# Patient Record
Sex: FEMALE | Race: WHITE | NOT HISPANIC OR LATINO | Employment: FULL TIME | URBAN - METROPOLITAN AREA
[De-identification: names, ages, dates, MRNs, and addresses within clinical notes are randomized per-mention and may not be internally consistent; named-entity substitution may affect disease eponyms.]

---

## 2017-08-08 ENCOUNTER — HOSPITAL ENCOUNTER (OUTPATIENT)
Dept: FAMILY MEDICINE CLINIC | Facility: CLINIC | Age: 47
Setting detail: SPECIMEN
Discharge: HOME | End: 2017-08-08
Attending: PREVENTIVE MEDICINE | Admitting: PREVENTIVE MEDICINE

## 2017-08-09 LAB
C TRACH RRNA SPEC QL PROBE: NORMAL
HAV IGM SERPL QL IA: NONREACTIVE
HBV CORE IGM SERPL QL IA: NONREACTIVE
HBV SURFACE AG SERPL QL IA: NONREACTIVE
HCV AB SER DONR QL: NORMAL
HCV AB SER DONR QL: NORMAL
HIV1+2 AB SERPL QL IA: NORMAL
N GONORRHOEA RRNA SPEC QL PROBE: NORMAL
SPECIMEN SOURCE: NORMAL
T PALLIDUM IGG SER QL: NONREACTIVE

## 2017-08-15 LAB — HSV1 IGM TITR SER IF: NEGATIVE {TITER}

## 2018-10-18 ENCOUNTER — HOSPITAL ENCOUNTER (OUTPATIENT)
Dept: FAMILY MEDICINE CLINIC | Facility: CLINIC | Age: 48
Setting detail: SPECIMEN
Discharge: HOME | End: 2018-10-18
Attending: PREVENTIVE MEDICINE | Admitting: PREVENTIVE MEDICINE

## 2018-10-18 LAB
ALBUMIN SERPL-MCNC: 3.5 G/DL (ref 3.5–4.8)
ALBUMIN/GLOB SERPL: 1.2 {RATIO} (ref 1–1.7)
ALP SERPL-CCNC: 103 IU/L (ref 32–91)
ALT SERPL-CCNC: 16 IU/L (ref 14–54)
ANION GAP SERPL CALC-SCNC: 12.4 MMOL/L (ref 10–20)
AST SERPL-CCNC: 18 IU/L (ref 15–41)
BASOPHILS # BLD AUTO: 0 10*3/UL (ref 0–0.2)
BASOPHILS NFR BLD AUTO: 1 % (ref 0–2)
BILIRUB SERPL-MCNC: 0.9 MG/DL (ref 0.3–1.2)
BUN SERPL-MCNC: 9 MG/DL (ref 8–20)
BUN/CREAT SERPL: 11.3 (ref 5.4–26.2)
CALCIUM SERPL-MCNC: 8.8 MG/DL (ref 8.9–10.3)
CHLORIDE SERPL-SCNC: 100 MMOL/L (ref 101–111)
CHOLEST SERPL-MCNC: 217 MG/DL
CHOLEST/HDLC SERPL: 3.7 {RATIO}
CONV CO2: 28 MMOL/L (ref 22–32)
CONV LDL CHOLESTEROL DIRECT: 155 MG/DL (ref 0–100)
CONV TOTAL PROTEIN: 6.4 G/DL (ref 6.1–7.9)
CREAT UR-MCNC: 0.8 MG/DL (ref 0.4–1)
DIFFERENTIAL METHOD BLD: (no result)
EOSINOPHIL # BLD AUTO: 0.3 10*3/UL (ref 0–0.3)
EOSINOPHIL # BLD AUTO: 5 % (ref 0–3)
ERYTHROCYTE [DISTWIDTH] IN BLOOD BY AUTOMATED COUNT: 13.9 % (ref 11.5–14.5)
GLOBULIN UR ELPH-MCNC: 2.9 G/DL (ref 2.5–3.8)
GLUCOSE SERPL-MCNC: 87 MG/DL (ref 65–99)
HCT VFR BLD AUTO: 41.2 % (ref 35–49)
HDLC SERPL-MCNC: 58 MG/DL
HGB BLD-MCNC: 13.8 G/DL (ref 12–15)
LDLC/HDLC SERPL: 2.7 {RATIO}
LIPID INTERPRETATION: ABNORMAL
LYMPHOCYTES # BLD AUTO: 1.8 10*3/UL (ref 0.8–4.8)
LYMPHOCYTES NFR BLD AUTO: 32 % (ref 18–42)
MCH RBC QN AUTO: 28.3 PG (ref 26–32)
MCHC RBC AUTO-ENTMCNC: 33.4 G/DL (ref 32–36)
MCV RBC AUTO: 84.5 FL (ref 80–94)
MONOCYTES # BLD AUTO: 0.5 10*3/UL (ref 0.1–1.3)
MONOCYTES NFR BLD AUTO: 9 % (ref 2–11)
NEUTROPHILS # BLD AUTO: 3 10*3/UL (ref 2.3–8.6)
NEUTROPHILS NFR BLD AUTO: 53 % (ref 50–75)
NRBC BLD AUTO-RTO: 0 /100{WBCS}
NRBC/RBC NFR BLD MANUAL: 0 10*3/UL
PLATELET # BLD AUTO: 272 10*3/UL (ref 150–450)
PMV BLD AUTO: 9.3 FL (ref 7.4–10.4)
POTASSIUM SERPL-SCNC: 4.4 MMOL/L (ref 3.6–5.1)
RBC # BLD AUTO: 4.87 10*6/UL (ref 4–5.4)
SODIUM SERPL-SCNC: 136 MMOL/L (ref 136–144)
TRIGL SERPL-MCNC: 89 MG/DL
VLDLC SERPL CALC-MCNC: 4 MG/DL
WBC # BLD AUTO: 5.6 10*3/UL (ref 4.5–11.5)

## 2018-10-19 LAB — 25(OH)D3 SERPL-MCNC: 25 NG/ML (ref 30–100)

## 2019-01-14 ENCOUNTER — HOSPITAL ENCOUNTER (OUTPATIENT)
Dept: FAMILY MEDICINE CLINIC | Facility: CLINIC | Age: 49
Setting detail: SPECIMEN
Discharge: HOME | End: 2019-01-14
Attending: PREVENTIVE MEDICINE | Admitting: PREVENTIVE MEDICINE

## 2019-01-14 LAB
ALBUMIN SERPL-MCNC: 3.8 G/DL (ref 3.5–4.8)
ALBUMIN/GLOB SERPL: 1.2 {RATIO} (ref 1–1.7)
ALP SERPL-CCNC: 98 IU/L (ref 32–91)
ALT SERPL-CCNC: 16 IU/L (ref 14–54)
ANION GAP SERPL CALC-SCNC: 12.7 MMOL/L (ref 10–20)
AST SERPL-CCNC: 19 IU/L (ref 15–41)
BASOPHILS # BLD AUTO: 0 10*3/UL (ref 0–0.2)
BASOPHILS NFR BLD AUTO: 1 % (ref 0–2)
BILIRUB SERPL-MCNC: 0.5 MG/DL (ref 0.3–1.2)
BUN SERPL-MCNC: 9 MG/DL (ref 8–20)
BUN/CREAT SERPL: 11.3 (ref 5.4–26.2)
CALCIUM SERPL-MCNC: 9.2 MG/DL (ref 8.9–10.3)
CHLORIDE SERPL-SCNC: 105 MMOL/L (ref 101–111)
CHOLEST SERPL-MCNC: 223 MG/DL
CHOLEST/HDLC SERPL: 3.9 {RATIO}
CONV CO2: 26 MMOL/L (ref 22–32)
CONV LDL CHOLESTEROL DIRECT: 160 MG/DL (ref 0–100)
CONV TOTAL PROTEIN: 6.9 G/DL (ref 6.1–7.9)
CREAT UR-MCNC: 0.8 MG/DL (ref 0.4–1)
DIFFERENTIAL METHOD BLD: (no result)
EOSINOPHIL # BLD AUTO: 0.2 10*3/UL (ref 0–0.3)
EOSINOPHIL # BLD AUTO: 4 % (ref 0–3)
ERYTHROCYTE [DISTWIDTH] IN BLOOD BY AUTOMATED COUNT: 14.5 % (ref 11.5–14.5)
GLOBULIN UR ELPH-MCNC: 3.1 G/DL (ref 2.5–3.8)
GLUCOSE SERPL-MCNC: 83 MG/DL (ref 65–99)
HCT VFR BLD AUTO: 44.8 % (ref 35–49)
HDLC SERPL-MCNC: 58 MG/DL
HGB BLD-MCNC: 14.6 G/DL (ref 12–15)
LDLC/HDLC SERPL: 2.8 {RATIO}
LIPID INTERPRETATION: ABNORMAL
LYMPHOCYTES # BLD AUTO: 1.9 10*3/UL (ref 0.8–4.8)
LYMPHOCYTES NFR BLD AUTO: 33 % (ref 18–42)
MCH RBC QN AUTO: 28.2 PG (ref 26–32)
MCHC RBC AUTO-ENTMCNC: 32.7 G/DL (ref 32–36)
MCV RBC AUTO: 86.1 FL (ref 80–94)
MONOCYTES # BLD AUTO: 0.3 10*3/UL (ref 0.1–1.3)
MONOCYTES NFR BLD AUTO: 6 % (ref 2–11)
NEUTROPHILS # BLD AUTO: 3.1 10*3/UL (ref 2.3–8.6)
NEUTROPHILS NFR BLD AUTO: 56 % (ref 50–75)
NRBC BLD AUTO-RTO: 0 /100{WBCS}
NRBC/RBC NFR BLD MANUAL: 0 10*3/UL
PLATELET # BLD AUTO: 282 10*3/UL (ref 150–450)
PMV BLD AUTO: 8.9 FL (ref 7.4–10.4)
POTASSIUM SERPL-SCNC: 4.7 MMOL/L (ref 3.6–5.1)
RBC # BLD AUTO: 5.2 10*6/UL (ref 4–5.4)
SODIUM SERPL-SCNC: 139 MMOL/L (ref 136–144)
TRIGL SERPL-MCNC: 108 MG/DL
VLDLC SERPL CALC-MCNC: 5.8 MG/DL
WBC # BLD AUTO: 5.6 10*3/UL (ref 4.5–11.5)

## 2019-01-28 ENCOUNTER — HOSPITAL ENCOUNTER (OUTPATIENT)
Dept: ONCOLOGY | Facility: CLINIC | Age: 49
Setting detail: INFUSION SERIES
Discharge: HOME | End: 2019-01-28
Attending: INTERNAL MEDICINE | Admitting: INTERNAL MEDICINE

## 2019-01-28 ENCOUNTER — HOSPITAL ENCOUNTER (OUTPATIENT)
Dept: ONCOLOGY | Facility: HOSPITAL | Age: 49
Discharge: HOME | End: 2019-01-28
Attending: INTERNAL MEDICINE | Admitting: INTERNAL MEDICINE

## 2019-01-28 ENCOUNTER — CLINICAL SUPPORT (OUTPATIENT)
Dept: ONCOLOGY | Facility: HOSPITAL | Age: 49
End: 2019-01-28

## 2019-01-28 LAB
ALBUMIN SERPL-MCNC: 3.4 G/DL (ref 3.5–4.8)
ALBUMIN/GLOB SERPL: 1.2 {RATIO} (ref 1–1.7)
ALP SERPL-CCNC: 94 IU/L (ref 32–91)
ALT SERPL-CCNC: 15 IU/L (ref 14–54)
ANION GAP SERPL CALC-SCNC: 11.2 MMOL/L (ref 10–20)
AST SERPL-CCNC: 17 IU/L (ref 15–41)
BILIRUB SERPL-MCNC: 0.2 MG/DL (ref 0.3–1.2)
BUN SERPL-MCNC: 4 MG/DL (ref 8–20)
BUN/CREAT SERPL: 5 (ref 5.4–26.2)
CALCIUM SERPL-MCNC: 8.7 MG/DL (ref 8.9–10.3)
CHLORIDE SERPL-SCNC: 102 MMOL/L (ref 101–111)
CONV CO2: 26 MMOL/L (ref 22–32)
CONV TOTAL PROTEIN: 6.3 G/DL (ref 6.1–7.9)
CREAT UR-MCNC: 0.8 MG/DL (ref 0.4–1)
GLOBULIN UR ELPH-MCNC: 2.9 G/DL (ref 2.5–3.8)
GLUCOSE SERPL-MCNC: 80 MG/DL (ref 65–99)
POTASSIUM SERPL-SCNC: 4.2 MMOL/L (ref 3.6–5.1)
SODIUM SERPL-SCNC: 135 MMOL/L (ref 136–144)

## 2019-01-28 NOTE — PROGRESS NOTES
PATIENTS ONCOLOGY RECORD LOCATED IN [facility]      Subjective     Name:  FRANCO MCKEON     Date:  2019  Address:  [facility] [facility] [place] [address]  Home: [unfilled]  :  1970 AGE:  48 y.o.        RECORDS OBTAINED:  Patients Oncology Record is located in [facility]

## 2019-02-11 ENCOUNTER — HOSPITAL ENCOUNTER (OUTPATIENT)
Dept: ONCOLOGY | Facility: HOSPITAL | Age: 49
Discharge: HOME | End: 2019-02-11
Attending: INTERNAL MEDICINE | Admitting: INTERNAL MEDICINE

## 2019-03-04 ENCOUNTER — HOSPITAL ENCOUNTER (OUTPATIENT)
Dept: ONCOLOGY | Facility: HOSPITAL | Age: 49
Discharge: HOME | End: 2019-03-04

## 2019-03-04 ENCOUNTER — HOSPITAL ENCOUNTER (OUTPATIENT)
Dept: ONCOLOGY | Facility: CLINIC | Age: 49
Setting detail: INFUSION SERIES
Discharge: HOME | End: 2019-03-04
Attending: INTERNAL MEDICINE | Admitting: INTERNAL MEDICINE

## 2019-06-03 ENCOUNTER — CONVERSION ENCOUNTER (OUTPATIENT)
Dept: FAMILY MEDICINE CLINIC | Facility: CLINIC | Age: 49
End: 2019-06-03

## 2019-06-03 ENCOUNTER — HOSPITAL ENCOUNTER (OUTPATIENT)
Dept: FAMILY MEDICINE CLINIC | Facility: CLINIC | Age: 49
Setting detail: SPECIMEN
Discharge: HOME | End: 2019-06-03
Attending: PREVENTIVE MEDICINE | Admitting: PREVENTIVE MEDICINE

## 2019-06-03 LAB
ALBUMIN SERPL-MCNC: 3.9 G/DL (ref 3.5–4.8)
ALBUMIN/GLOB SERPL: 1.1 {RATIO} (ref 1–1.7)
ALP SERPL-CCNC: 105 IU/L (ref 32–91)
ALT SERPL-CCNC: 15 IU/L (ref 14–54)
ANION GAP SERPL CALC-SCNC: 15.1 MMOL/L (ref 10–20)
AST SERPL-CCNC: 19 IU/L (ref 15–41)
BASOPHILS # BLD AUTO: 0.1 10*3/UL (ref 0–0.2)
BASOPHILS NFR BLD AUTO: 1 % (ref 0–2)
BILIRUB SERPL-MCNC: 0.6 MG/DL (ref 0.3–1.2)
BUN SERPL-MCNC: 11 MG/DL (ref 8–20)
BUN/CREAT SERPL: 12.2 (ref 5.4–26.2)
CALCIUM SERPL-MCNC: 9.1 MG/DL (ref 8.9–10.3)
CHLORIDE SERPL-SCNC: 103 MMOL/L (ref 101–111)
CONV CO2: 24 MMOL/L (ref 22–32)
CONV TOTAL PROTEIN: 7.6 G/DL (ref 6.1–7.9)
CREAT UR-MCNC: 0.9 MG/DL (ref 0.4–1)
DIFFERENTIAL METHOD BLD: (no result)
EOSINOPHIL # BLD AUTO: 0.3 10*3/UL (ref 0–0.3)
EOSINOPHIL # BLD AUTO: 5 % (ref 0–3)
ERYTHROCYTE [DISTWIDTH] IN BLOOD BY AUTOMATED COUNT: 14.2 % (ref 11.5–14.5)
ERYTHROCYTE [SEDIMENTATION RATE] IN BLOOD BY WESTERGREN METHOD: 33 MM/HR (ref 0–20)
GLOBULIN UR ELPH-MCNC: 3.7 G/DL (ref 2.5–3.8)
GLUCOSE SERPL-MCNC: 84 MG/DL (ref 65–99)
HCT VFR BLD AUTO: 45.8 % (ref 35–49)
HGB BLD-MCNC: 15.1 G/DL (ref 12–15)
LYMPHOCYTES # BLD AUTO: 2 10*3/UL (ref 0.8–4.8)
LYMPHOCYTES NFR BLD AUTO: 33 % (ref 18–42)
MCH RBC QN AUTO: 28.3 PG (ref 26–32)
MCHC RBC AUTO-ENTMCNC: 32.9 G/DL (ref 32–36)
MCV RBC AUTO: 86 FL (ref 80–94)
MONOCYTES # BLD AUTO: 0.4 10*3/UL (ref 0.1–1.3)
MONOCYTES NFR BLD AUTO: 7 % (ref 2–11)
NEUTROPHILS # BLD AUTO: 3.2 10*3/UL (ref 2.3–8.6)
NEUTROPHILS NFR BLD AUTO: 54 % (ref 50–75)
NRBC BLD AUTO-RTO: 0 /100{WBCS}
NRBC/RBC NFR BLD MANUAL: 0 10*3/UL
PLATELET # BLD AUTO: 311 10*3/UL (ref 150–450)
PMV BLD AUTO: 9.2 FL (ref 7.4–10.4)
POTASSIUM SERPL-SCNC: 4.1 MMOL/L (ref 3.6–5.1)
RBC # BLD AUTO: 5.33 10*6/UL (ref 4–5.4)
SODIUM SERPL-SCNC: 138 MMOL/L (ref 136–144)
TSH SERPL-ACNC: 1.01 UIU/ML (ref 0.34–5.6)
WBC # BLD AUTO: 6.1 10*3/UL (ref 4.5–11.5)

## 2019-06-04 VITALS
DIASTOLIC BLOOD PRESSURE: 75 MMHG | BODY MASS INDEX: 33.27 KG/M2 | OXYGEN SATURATION: 96 % | HEART RATE: 51 BPM | RESPIRATION RATE: 16 BRPM | HEIGHT: 66 IN | SYSTOLIC BLOOD PRESSURE: 123 MMHG | WEIGHT: 207 LBS

## 2019-06-05 ENCOUNTER — HOSPITAL ENCOUNTER (OUTPATIENT)
Dept: CARDIOLOGY | Facility: HOSPITAL | Age: 49
Discharge: HOME | End: 2019-06-05
Attending: PREVENTIVE MEDICINE | Admitting: PREVENTIVE MEDICINE

## 2019-06-06 NOTE — PROGRESS NOTES
Visit Type:  Follow-up Visit  Referring Provider:  Silvia Henderson MD  Primary Provider:  Beatriz MUSA MD MPH    CC:  3 week fu on ear .    History of Present Illness:  Dizziness unimproved and ears still seem congested.  had some vaginal itching while on Amoxicillin and burning with urination but ok now.  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  [place]'s evaluated by Dr. Brooks nad biopsy scheduled 6/10/ Dr. lugo.    Dizziness seems to be when tips head to R and gets up and down. No chest pain or shortness of breath.  Stopped [place] before started Amoxicilloin.  No alcohol butr does smoke Pot twice or so//week and seems strong before dizziness started.      Past Medical History:     Reviewed history from 05/13/2019 and no changes required:                Vision Flashes        Abnormal Pap         No Drug Allergies ?    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        Vaginal wart removed        Tubes in ears         Tonsillectomy        Bilateral Tubal Ligation        Dental removal     Family History Summary:      Reviewed history Last on 10/11/2018 and no changes required:06/03/2019  Nephew - Has Family History of Diabetes - Entered On: 5/24/2016  Mother - Has Family History of Hypertension - Entered On: 5/24/2016  Mother - Has Family History of Heart Disease - Entered On: 5/24/2016  Niece - Has Family History of Diabetes - Entered On: 5/24/2016      Social History:     Reviewed history from 05/13/2019 and no changes required:        Patient currently smokes every day.        Passive Smoke: Y        Alcohol Use: Y        Drug Use: Y        HIV/High Risk: N        Regular Exercise: Y        Hx Domestic Abuse: N        Evangelical Affecting Care: N              Active Medications (reviewed today):  ECHINACEA 500 MG ORAL CAPSULE (ECHINACEA) 1 twice daily  EPIPEN 2-RAAD 0.3 MG/0.3ML  INJECTION SOLUTION AUTO-INJECTOR (EPINEPHRINE) use as directed    Current Allergies (reviewed today):  No known allergies    Current Medications (including medications started today):   ECHINACEA 500 MG ORAL CAPSULE (ECHINACEA) 1 twice daily  EPIPEN 2-RAAD 0.3 MG/0.3ML INJECTION SOLUTION AUTO-INJECTOR (EPINEPHRINE) use as directed      Risk Factors:     Smoked Tobacco Use:  Current every day smoker     Cigarettes:  Yes -- 1 pack(s) per day,Smokeless Tobacco Use:  Never     Counseled to quit/cut down:  yes  Passive smoke exposure:  yes  Drug use:  yes     Substance:  marijuana  HIV high-risk behavior:  no  Caffeine use:  4+ drinks per day  Alcohol use:  yes     Type:  rum      Drinks per day:  social     Has patient --        Felt need to cut down:  no        Been annoyed by complaints:  no        Felt guilty about drinking:  no        Needed eye opener in the morning:  no     Counseled to quit/cut down alcohol use:  yes  Exercise:  yes     Times per week:  5     Type of Exercise:  walking   Seatbelt use:  100 %  Sun Exposure:  remote    Dietary Counseling: yes    Previous Tobacco Use: Signed On - 05/13/2019  Smoked Tobacco Use:  Current every day smoker     Cigarettes:  Yes -- 1 pack(s) per day,Smokeless Tobacco Use:  Never     Counseled to quit/cut down:  yes  Passive smoke exposure:  yes  Drug use:  yes     Substance:  marijuana  HIV high-risk behavior:  no  Caffeine use:  4+ drinks per day    Previous Alcohol Use: Signed On - 05/13/2019  Alcohol use:  yes     Type:  rum      Drinks per day:  social     Has patient --        Felt need to cut down:  no        Been annoyed by complaints:  no        Felt guilty about drinking:  no        Needed eye opener in the morning:  no     Counseled to quit/cut down alcohol use:  yes  Exercise:  yes     Times per week:  5     Type of Exercise:  walking   Seatbelt use:  100 %  Sun Exposure:  remote    Mammogram History:     Date of Last Mammogram:  06/23/2016      Review of  Systems     General       Denies fever, chills, sweats, anorexia, fatigue, weakness, malaise, weight loss, weight gain and sleep disorder.    Eyes       Denies vision loss - 1 eye, double vision, eye irritation, vision loss - both eyes, blurring, eye pain, halos, discharge, light sensitivity, Color Blindness, Decreased night vision, excessive tearing, eye redness, periorbital puffiness and wears   glass/contacts.    ENT       Complains of nasal congestion.       ear fullness    CV       Complains of lightheadedness.    Resp       Denies sleep disturbances due to breathing, cough, shortness of breath, coughing up blood, chest discomfort, wheezing, excessive sputum, excessive snoring and TB exposure risk.           Complains of painful urination.    Derm       Denies excessive perspiration, night sweats, suspicious lesions, changes in nail beds, dryness, poor wound healing, unusual hair distribution, skin cancer, itching, changes in color of skin, flushing, rash, change in lesion, hair loss, change in hair   texture, lesion with inflammation, lesion with increase in size and lesion with change in color.    Neuro       Complains of sensation of room spinning and dizziness.    Allergy       Complains of seasonal allergies.      Vital Signs:    Patient Profile:    48 Years Old Female  Height:     66 inches (167.64 cm)  Weight:     207 pounds  BMI:        33.41     BSA:        2.03  O2 Sat:     96 %  Temp:       98.1 degrees F oral  Pulse rate: 51 / minute  Resp:       16 per minute  BP Sittin / 75  (right arm)  BP Standin / 85  (right arm)  Cuff size:  large      Problems: Active problems were reviewed with the patient during this visit.  Medications: Medications were reviewed with the patient during this visit.  Allergies: Allergies were reviewed with the patient during this visit.  No Known Allergy.  No Known Drug Allergy.        Vitals Entered By: Silvia Henderson MD (Sandy  3, 2019 10:12  AM)      Serial Vital Signs/Assessments:    Comments:  10:17 AM  sitting   By: Silvia Henderson MD        Physical Exam    General:      obese.    Head:      normocephalic and atraumatic.    Eyes:      PERRL/EOM intact, conjunctiva and sclera clear with out nystagmus.  No nystagmus up or down  Ears:      scars but no more redness or dullness..  Well healed R neck scarring  Nose:      no deformity, discharge, inflammation, or lesions.    Mouth:      no deformity or lesions with good dentition.    Neck:      no masses, thyromegaly, or abnormal cervical nodes.    Lungs:      clear bilaterally to auscultation.    Heart:      bradycardia  Abdomen:       normal bowel sounds; no hepatosplenomegaly no ventral,umbilical hernias or masses noted.    Msk:      no deformity or scoliosis noted of thoracic or lumbar spine.    Pulses:      pulses normal in all 4 extremities.    Extremities:      no clubbing, cyanosis, edema, or deformity noted with normal full range of motion of joints of all four extremities   Neurologic:      no focal deficits, cranial nerves II-XII grossly intact with normal sensation, reflexes, coordination, muscle strength and tone.    Skin:      intact without lesions or rashes.    Cervical Nodes:      no significant adenopathy.    Psych:      alert and cooperative; normal mood and affect; normal attention span and concentration.      Diabetes Management Exam:      Foot Exam (with socks and/or shoes not present):        Pulses:           pulses normal in all 4 extremities.        Blood Pressure:  Today's BP: 123/75 mm Hg    Labwork:   Most Recent Lab Results:   LDL: 160 mg/dL 01/14/2019        Impression & Recommendations:    Problem # 1:  DIZZINESS AND GIDDINESS (ICD-780.4) (DHM75-D96)    Orders:  EKG w Interpretation and Report (81589)  24 Hour Holter Monitor (24 Hr HM)  Venipuncture (22934)  [facility] CBC W/DIFF; PATH REVIEW IF INDICATED (CBC)  [facility] COMPREHENSIVE METABOLIC PANEL (CMP) (MPC)  [facility] THYROID  STIMULATING HORMONE (TSH) (TSH)  [facility] SEDIMENTATION RATE (ESR)  Spoke with Dr. Nathan and he'll see this week    Problem # 2:  Bradycardia (ICD-427.89) (HLR93-X35.1)  Eval and treat Cardiology  Holter    Problem # 3:  Acute otitis media, left (ICD-382.9) (CJB19-O00.92)  eval and treat ENT      Patient Instructions:  1)  If dizziness worsens call 911 and go to [place] or local ER.  2)  Staff to see if can get Holter moniter placed today or tomorrow and followup with Dr. Nathan in [place] on Wednesday.  3)  Set up appointment with ENT for sinus congestion.                        Medication Administration    Orders Added:  1)  Ofc Vst, Est Level IV [84146]  2)  EKG w Interpretation and Report [52065]  3)  24 Hour Holter Monitor [24 Hr HM]  4)  Venipuncture [29324]  5)  [facility] CBC W/DIFF; PATH REVIEW IF INDICATED [CBC]  6)  [facility] COMPREHENSIVE METABOLIC PANEL (CMP) [MPC]  7)  [facility] THYROID STIMULATING HORMONE (TSH) [TSH]  8)  [facility] SEDIMENTATION RATE [ESR]  9)  Cardiology Consult [CardOC]  10)  ENT Office Consult [ENTOC]  ]      Electronically signed by Silvia Henderson MD on 06/03/2019 at 11:20 AM  ________________________________________________________________________                   Allergies:   No Known Allergies        Labwork:   Most Recent Lab Results:   LDL: 160 mg/dL 01/14/2019          Impression & Recommendations:    Problem # 4:  TOBACCO USE DISORDER (ICD-305.1) (EBL66-D05.209)  STOP SMOKING      Patient Instructions:  1)  STOP SMOKING                      Medication Administration        Electronically signed by Silvia Henderson MD on 06/03/2019 at 11:21 AM  ________________________________________________________________________       Disclaimer: Converted Note message may not contain all data elements that existed in the legacy source system. Please see [employer] for the original note details.

## 2019-06-17 ENCOUNTER — TELEPHONE (OUTPATIENT)
Dept: FAMILY MEDICINE CLINIC | Facility: CLINIC | Age: 49
End: 2019-06-17

## 2019-06-17 NOTE — TELEPHONE ENCOUNTER
Phone Note   Call from Patient  Call back at Cell Phone [phone]  Summary of Call: Patient wants to know if you would do a lyme and tick panel on her. She states Dr Nathan suggested that she gets that done.   Initial call taken by: Cora RAMIREZ,  June 14, 2019 8:34 AM    Follow-up for Phone Call   Follow-up Details: Don't see that in his note.  When was tick bite and how long was it attached?  Any rash?  Follow-up by: Silvia Henderson MD,  June 14, 2019 8:58 AM    Additional Follow-up for Phone Call   Additional Follow-up Details: She states it has been going on for over a month. She was bite over 3 times and no rash with them.   Additional Follow-up by: Cora RAMIREZ,  June 14, 2019 12:10 PM

## 2019-06-18 DIAGNOSIS — R00.1 BRADYCARDIA: Primary | ICD-10-CM
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
## 2019-07-01 ENCOUNTER — CLINICAL SUPPORT (OUTPATIENT)
Dept: FAMILY MEDICINE CLINIC | Facility: CLINIC | Age: 49
End: 2019-07-01
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
## 2019-07-01 PROCEDURE — 86757 RICKETTSIA ANTIBODY: CPT | Performed by: PREVENTIVE MEDICINE

## 2019-07-01 PROCEDURE — 36415 COLL VENOUS BLD VENIPUNCTURE: CPT | Performed by: PREVENTIVE MEDICINE

## 2019-07-01 PROCEDURE — 86618 LYME DISEASE ANTIBODY: CPT | Performed by: PREVENTIVE MEDICINE

## 2019-07-01 PROCEDURE — 86666 EHRLICHIA ANTIBODY: CPT | Performed by: PREVENTIVE MEDICINE

## 2019-07-01 PROCEDURE — 87798 DETECT AGENT NOS DNA AMP: CPT | Performed by: PREVENTIVE MEDICINE

## 2019-07-03 LAB
B BURGDOR IGG+IGM SER-ACNC: <0.91 ISR (ref 0–0.9)
B BURGDOR IGM SER-ACNC: <0.8 INDEX (ref 0–0.79)

## 2019-07-05 LAB
R RICKETTSI IGG SER QL IA: NEGATIVE
R RICKETTSI IGM TITR SER: 0.64 INDEX (ref 0–0.89)
RICK SF IGG TITR SER IF: NORMAL {TITER}
RICK SF IGM TITR SER IF: NORMAL {TITER}
TYPHUS FEVER GROUP IGG: NORMAL
TYPHUS FEVER GROUP IGM: NORMAL

## 2019-07-08 LAB
A PHAGOCYTOPH IGM TITR SER IF: NEGATIVE {TITER}
CONV HGE IGG TITER: NEGATIVE
E CHAFFEENSIS IGG TITR SER IF: NEGATIVE {TITER}
E. CHAFFEENSIS (HME) IGM TITER: NEGATIVE

## 2019-07-09 LAB — A PHAGOCYTOPH DNA BLD QL NAA+PROBE: NEGATIVE

## 2019-09-05 PROBLEM — D11.9 WARTHIN TUMOR: Status: ACTIVE | Noted: 2019-09-05

## 2019-09-05 PROBLEM — R87.69: Chronic | Status: ACTIVE | Noted: 2019-09-05

## 2019-09-05 PROBLEM — R87.69: Status: ACTIVE | Noted: 2019-09-05

## 2019-09-05 NOTE — PROGRESS NOTES
Hematology/Oncology Outpatient Follow Up    PATIENT NAME:Melia García  :1970  MRN: [id number]  PRIMARY CARE PHYSICIAN: Silvia Henderson MD  REFERRING PHYSICIAN: Silvia Henderson MD    No chief complaint on file.       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 Silvia Henderson.  Patient was complaining of migraines that caused her to have pain in her neck. This patient is a 48-year-old  female who was seen for a routine checkup at [facility] in 2016. A Pap smear was performed and she was noted to have a slightly raised warty-appearing 1 cm lesion on her left vulva and a biopsy was recommended. There was a very small white wart-appearing lesion on the right labia. Pap smear was negative for intraepithelial lesion or malignancy. HIV, hepatitis panel, HSV screens were all negative. She then underwent an endometrial biopsy by Dr. Barton on 17 that had stripes of benign surface endometrial epithelium with no hyperplasia or carcinoma included. A D&C was performed on 17 revealing fragments of benign endometrial polyp and endometrial curettings with no atypia of malignancy identified. Groin skin tags were intradermal nevus and fibroepithelial polyps. Right labia majora skin lesion contained high-grade squamous intraepithelial lesion (HSIL) with HSIL extending to inked excision margin. The patient claims to have lost her insurance since then and did not follow up with Dr. Barton. She was seen in followup by her primary care provider, Silvia Henderson M.D., who referred her to me, but the patient canceled two previous appointments before showing up  to the office today on 1/28/19. The patient had developed a right neck mass in January 2016 and a CT scan of the neck on 1/12/16 had revealed two nonaggressive-appearing cystic lesions in the right parotid gland. Cervical spine was intact with normal neck musculature, paravertebral and prevertebral musculature. She was seen by ENT doctor Delon Mata M.D. where local excision was done in the office that resulted in a residual tumor and facial nerve weakness on the right marginal and cervical branches with some buccal branch weakness as well for a diagnosed Warthin tumor of the parotid. She was then operated on by Demario Alfaro M.D. on 1/25/16 with a right parotidectomy with facial nerve dissection (revision) performed. Pathology revealed Warthin tumor with extensive fibrosis. There was a mixture of dense fibrous tissue which tended to separate a Warthin’s tumor from atrophic-appearing salivary gland. Benign lymph nodes were present within the segment of gland. The extensive scarring present along the margin between the Warthin tumor and the body of the superficial lobe of the parotid likely caused atrophy of portions of the gland and obstruction which led to periductal inflammation. The patient was last seen by Dr. Alfaro on 8/2/16 with plans of seeing her again on 2/2/17, but patient did not return to him for followup.  The patient was referred to Dr. Barton for consultation regarding followup for her HSIL of the right labia.   • 2/11/19 – CT soft tissue neck status post resection of the right parotid gland with possibly some residual right parathyroid tissue. 9 mm focal density superficial to what appears to be residual parotid tissue is of uncertain etiology but has a benign appearance. The low-attenuation masses previously seen related to the right parotid gland were no longer seen.   • 3/4/19 – Patient states she does not want to follow up with Dr. Barton for her HSIL. She would prefer to see a different  gynecologist. She was referred to Dr. Ceja.   2.   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     Past Medical History:   Diagnosis Date   • Vaginal venereal warts    • Warthin's tumor        Past Surgical History:   Procedure Laterality Date   • DILATATION AND CURETTAGE  2017   • EAR TUBES     • LAPAROSCOPIC TUBAL LIGATION     • TONSILLECTOMY     • WART REMOVAL      vaginal wart removal       No current outpatient medications on file.    Allergies not on file    Family History   Problem Relation Age of Onset   • No Known Problems Mother    • No Known Problems Father    • No Known Problems Sister    • No Known Problems Brother    • No Known Problems Maternal Grandmother    • No Known Problems Maternal Grandfather    • No Known Problems Paternal Grandmother    • No Known Problems Paternal Grandfather        Cancer-related family history is not on file.    Social History     Tobacco Use   • Smoking status: Current Every Day Smoker     Packs/day: 1.00     Types: Cigarettes     Start date: 1987   Substance Use Topics   • Alcohol use: Yes     Frequency: Monthly or less   • Drug use: Yes     Types: Marijuana     Comment: couple of times a week       I have reviewed the history of present illness, past medical history, family history, social history, lab results, all notes and other records since the patient was last seen on Visit 3/4/19.    SUBJECTIVE:      Katherine Whitman CMA (AAMA) was present during office visit.           REVIEW OF SYSTEMS:  Review of Systems    OBJECTIVE:    There were no vitals filed for this visit.    ECOG  {[facility] Onc ECOG Status:20874}    Physical Exam    RECENT LABS  WBC   Date Value Ref Range Status   06/03/2019 6.1 4.5 - 11.5 10*3/uL Final     RBC   Date Value Ref Range Status   06/03/2019 5.33 4.00 - 5.40 10*6/uL Final     Hemoglobin   Date Value Ref Range Status   06/03/2019 15.1 (H) 12.0 - 15.0 g/dL Final     Hematocrit   Date Value Ref Range Status    06/03/2019 45.8 35 - 49 % Final     MCV   Date Value Ref Range Status   06/03/2019 86.0 80 - 94 fL Final     MCH   Date Value Ref Range Status   06/03/2019 28.3 26 - 32 pg Final     MCHC   Date Value Ref Range Status   06/03/2019 32.9 32 - 36 g/dL Final     RDW   Date Value Ref Range Status   06/03/2019 14.2 11.5 - 14.5 % Final     MPV   Date Value Ref Range Status   06/03/2019 9.2 7.4 - 10.4 fL Final     Platelets   Date Value Ref Range Status   06/03/2019 311 150 - 450 10*3/uL Final     Neutrophil Rel %   Date Value Ref Range Status   06/03/2019 54 50 - 75 % Final     Lymphocyte Rel %   Date Value Ref Range Status   06/03/2019 33 18 - 42 % Final     Monocyte Rel %   Date Value Ref Range Status   06/03/2019 7 2 - 11 % Final     Eosinophil Rel %   Date Value Ref Range Status   06/03/2019 5 (H) 0 - 3 % Final     Basophil Rel %   Date Value Ref Range Status   06/03/2019 1 0 - 2 % Final     Neutrophils Absolute   Date Value Ref Range Status   06/03/2019 3.2 2.3 - 8.6 10*3/uL Final     Lymphocytes Absolute   Date Value Ref Range Status   06/03/2019 2.0 0.8 - 4.8 10*3/uL Final     Monocytes Absolute   Date Value Ref Range Status   06/03/2019 0.4 0.1 - 1.3 10*3/uL Final     Eosinophils Absolute   Date Value Ref Range Status   06/03/2019 0.3 0.0 - 0.3 10*3/uL Final     Basophils Absolute   Date Value Ref Range Status   06/03/2019 0.1 0 - 0.2 10*3/uL Final     nRBC   Date Value Ref Range Status   06/03/2019 0 0 /100[WBCs] Final       Lab Results   Component Value Date    GLUCOSE 84 06/03/2019    BUN 11 06/03/2019    CREATININE 0.9 06/03/2019    BCR 12.2 06/03/2019    K 4.1 06/03/2019    CO2 24 06/03/2019    CALCIUM 9.1 06/03/2019    ALBUMIN 3.9 06/03/2019    LABIL2 1.1 06/03/2019    AST 19 06/03/2019    ALT 15 06/03/2019         Assessment/Plan     High grade squamous intraepithelial lesion of the right labia majora    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).   Will follow up in *** months with ***.     I counselled Melia of the risks of continuing to use tobacco and cessation.    During this visit, I spent 3-10 minutes counseling the patient regarding tobacco cessation.     Patient verbalized understanding and is in agreement of the above plan.      Much of the above report is an electronic transcription/translation of the spoken language to printed text using Dragon Software. As such, the subtleties and finesse of the spoken language may permit erroneous, or at times, nonsensical words or phrases to be inadvertently transcribed; thus changes may be made at a later date to rectify these errors.

## 2019-09-09 ENCOUNTER — APPOINTMENT (OUTPATIENT)
Dept: ONCOLOGY | Facility: CLINIC | Age: 49
End: 2019-09-09

## 2020-04-22 ENCOUNTER — RESULTS ENCOUNTER (OUTPATIENT)
Dept: FAMILY MEDICINE CLINIC | Facility: CLINIC | Age: 50
End: 2020-04-22

## 2020-04-22 ENCOUNTER — OFFICE VISIT (OUTPATIENT)
Dept: FAMILY MEDICINE CLINIC | Facility: CLINIC | Age: 50
End: 2020-04-22

## 2020-04-22 VITALS — BODY MASS INDEX: 35.99 KG/M2 | WEIGHT: 223 LBS | TEMPERATURE: 97.8 F

## 2020-04-22 DIAGNOSIS — R11.0 NAUSEA: Primary | ICD-10-CM

## 2020-04-22 DIAGNOSIS — R19.7 DIARRHEA, UNSPECIFIED TYPE: ICD-10-CM

## 2020-04-22 DIAGNOSIS — F17.200 TOBACCO DEPENDENCE SYNDROME: ICD-10-CM

## 2020-04-22 DIAGNOSIS — N39.0 URINARY TRACT INFECTION WITHOUT HEMATURIA, SITE UNSPECIFIED: ICD-10-CM

## 2020-04-22 PROBLEM — R74.8 HIGH ALKALINE PHOSPHATASE: Status: ACTIVE | Noted: 2018-10-19

## 2020-04-22 PROBLEM — R20.2 HAND PARESTHESIA: Status: ACTIVE | Noted: 2017-08-08

## 2020-04-22 PROBLEM — R00.1 BRADYCARDIA: Status: ACTIVE | Noted: 2019-06-03

## 2020-04-22 PROBLEM — E55.9 VITAMIN D DEFICIENCY: Status: ACTIVE | Noted: 2018-10-11

## 2020-04-22 PROBLEM — E78.5 HYPERLIPIDEMIA: Status: ACTIVE | Noted: 2018-10-19

## 2020-04-22 PROBLEM — R42 DISEQUILIBRIUM: Status: ACTIVE | Noted: 2019-05-13

## 2020-04-22 PROCEDURE — 99214 OFFICE O/P EST MOD 30 MIN: CPT | Performed by: PREVENTIVE MEDICINE

## 2020-04-22 RX ORDER — HYDROCODONE BITARTRATE AND ACETAMINOPHEN 5; 325 MG/1; MG/1
1 TABLET ORAL
COMMUNITY
Start: 2020-01-27 | End: 2020-04-22

## 2020-04-22 RX ORDER — IBUPROFEN 600 MG/1
600 TABLET ORAL 4 TIMES DAILY
COMMUNITY
Start: 2020-01-27 | End: 2020-04-22

## 2020-04-22 RX ORDER — PROMETHAZINE HYDROCHLORIDE 25 MG/1
12.5 TABLET ORAL 4 TIMES DAILY
COMMUNITY
Start: 2020-01-27 | End: 2020-04-22

## 2020-04-22 RX ORDER — ECHINACEA 500 MG
1 CAPSULE ORAL EVERY 12 HOURS
COMMUNITY
Start: 2019-05-13 | End: 2021-03-19

## 2020-04-22 RX ORDER — EPINEPHRINE 0.3 MG/.3ML
1 INJECTION SUBCUTANEOUS AS NEEDED
COMMUNITY
Start: 2016-05-24 | End: 2021-03-11 | Stop reason: SDUPTHER

## 2020-04-22 RX ORDER — DOCUSATE SODIUM 100 MG/1
100 CAPSULE, LIQUID FILLED ORAL
COMMUNITY
Start: 2020-01-27 | End: 2020-04-22

## 2020-04-22 NOTE — PROGRESS NOTES
Subjective   Melia García is a 49 y.o. female presents for No chief complaint on file.      Health Maintenance Due   Topic Date Due   • MAMMOGRAM  1969   • URINE MICROALBUMIN  1969   • ANNUAL PHYSICAL  06/16/1972   • TDAP/TD VACCINES (1 - Tdap) 06/16/1980   • PNEUMOCOCCAL VACCINE (19-64 MEDIUM RISK) (1 of 1 - PPSV23) 06/16/1988   • DIABETIC FOOT EXAM  01/28/2019   • PAP SMEAR  01/28/2019   • HEMOGLOBIN A1C  01/28/2019   • DIABETIC EYE EXAM  01/28/2019   • COLONOSCOPY  01/28/2019   • ZOSTER VACCINE (1 of 2) 06/16/2019   • LIPID PANEL  04/22/2020       You have chosen to receive care through a telephone visit. Do you consent to use a telephone visit for your medical care today? 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  Slight dysuria but still has stitch from surgery.  Calling today due to 3 week history of intermittent diarrhea-once weekly-without weightloss.  Some am nausea and Alk phos elevated in past.  Takes Ibuprofen and Tylenol.  Episode started after went into mouse infested house to get belongings without mask-that evening diarrhea started.  Temperature 99.8 three weeks ago and hsad some cough which is improving.  No SOA or known Covid exposure.  Can no longer eat Pork and seems worse if eats it and sister had same symptoms and improved with GB removal.  No significant stomach pain but has had belcjhing.  Stools in between times look like red royce.  Still smoking and not ready to stop  Spent 48 minutes on phone call       Vitals:    04/22/20 0900   Temp: 97.8 °F (36.6 °C)   Weight: 101 kg (223 lb)     Body mass index is 35.99 kg/m².    Current Outpatient Medications on File Prior to Visit   Medication Sig Dispense Refill   • Echinacea 500 MG capsule 1 capsule Every 12 (Twelve) Hours.     • EPINEPHrine (EpiPen 2-Filiberto) 0.3 MG/0.3ML solution auto-injector injection Inject 1 ampule  under the skin into the appropriate area as directed As Needed.       No current facility-administered medications on file prior to visit.        The following portions of the patient's history were reviewed and updated as appropriate: allergies, current medications, past family history, past medical history, past social history, past surgical history and problem list.    Review of Systems   Constitutional: Positive for fever.   HENT: Positive for congestion. Negative for sinus pressure and sore throat.    Eyes: Negative.    Respiratory: Positive for choking. Negative for cough.    Cardiovascular: Negative.    Gastrointestinal: Positive for diarrhea.   Endocrine: Negative.    Genitourinary: Positive for dysuria and vaginal pain.   Musculoskeletal: Negative.    Skin: Negative.    Allergic/Immunologic: Positive for environmental allergies.   Neurological: Negative.    Hematological: Negative.    Psychiatric/Behavioral: Negative.        Objective   Physical Exam   Constitutional: She is oriented to person, place, and time. No distress.   Pulmonary/Chest: Effort normal.   Musculoskeletal: Normal range of motion.   Lymphadenopathy:     She has no cervical adenopathy.   Neurological: She is alert and oriented to person, place, and time.   Skin: She is not diaphoretic.   Psychiatric: She has a normal mood and affect.     PHQ-9 Total Score:      Assessment/Plan   Diagnoses and all orders for this visit:    Nausea  Comments:  Avoid spicey and greasy food and limit dairy.  Stop pork.  US GB if persist  Orders:  -     Cancel: CBC Auto Differential  -     Cancel: Comprehensive Metabolic Panel  -     Cancel: Sedimentation Rate; Future  -     Cancel: Amylase  -     Cancel: Lipase  -     Cancel: Urinalysis With Culture If Indicated -; Future  -     Amylase; Future  -     CBC Auto Differential; Future  -     Comprehensive Metabolic Panel; Future  -     Urinalysis With Culture If Indicated -; Future  -     Lipase; Future  -      Sedimentation Rate; Future  -     Lipase  -     Urinalysis With Culture If Indicated -  -     Comprehensive Metabolic Panel  -     Amylase  -     CBC Auto Differential  -     Sedimentation Rate    Tobacco dependence syndrome    Diarrhea, unspecified type  Comments:  Labs and stool checks X2  Orders:  -     Gastrointestinal Panel, PCR - Stool, Per Rectum; Future  -     Cancel: Clostridium Difficile Toxin, PCR - Stool, Per Rectum; Future  -     Clostridium Difficile Toxin, PCR - Stool, Per Rectum; Future    Urinary tract infection without hematuria, site unspecified  Comments:  Repeat urine and culture if indicated        Patient Instructions   If fever, shortness of air, blood in stool, dysuria, fever, chills or abdominal pain to ER.  Call [facility] office and wear mask to go in to get labs.  Have them give her two steril containers for stool samples.  Avoid spicey greasy foods and limit dairy.

## 2020-04-22 NOTE — PATIENT INSTRUCTIONS
If fever, shortness of air, blood in stool, dysuria, fever, chills or abdominal pain to ER.  Call [facility] office and wear mask to go in to get labs.  Have them give her two steril containers for stool samples.  Avoid spicey greasy foods and limit dairy.

## 2020-04-23 ENCOUNTER — TELEPHONE (OUTPATIENT)
Dept: FAMILY MEDICINE CLINIC | Facility: CLINIC | Age: 50
End: 2020-04-23

## 2020-04-23 NOTE — TELEPHONE ENCOUNTER
LMTC/Sent message through [employer] [employer]. Hub can give results    Sed rate just came back and was slightly elevated so we'll wait on urine culture and have ordered GB US-does [facility]?

## 2020-04-23 NOTE — TELEPHONE ENCOUNTER
Sed rate just came back and was slightly elevated so we'll wait on urine culture and have ordered GB US-does she want to [facility]?

## 2020-04-23 NOTE — TELEPHONE ENCOUNTER
Is there anything else with the rest of the labs that you want me to tell her? The results message from yesterday had nothing on it

## 2020-04-23 NOTE — TELEPHONE ENCOUNTER
Urine culture pending.  Rest of labs except Alk phos and urine were ok--I think it went to her MY Chart-trying to send directly if they have one and I think can be handled that way-not sure why you got it

## 2020-04-24 ENCOUNTER — TELEPHONE (OUTPATIENT)
Dept: FAMILY MEDICINE CLINIC | Facility: CLINIC | Age: 50
End: 2020-04-24

## 2020-04-24 DIAGNOSIS — N39.0 URINARY TRACT INFECTION WITHOUT HEMATURIA, SITE UNSPECIFIED: Primary | ICD-10-CM

## 2020-04-24 NOTE — TELEPHONE ENCOUNTER
Left message to call back. HUB can give message:    Urine culture showed contaminants so need to repeat careful clean catch urine and hold vaginal wall apart while she collects-she can go back to [facility] on [address] in [place]  instead of [facility] to collect-order placed                                Sed rate just came back and was slightly elevated so we'll wait on urine culture and have ordered GB US-does [facility] want [facility]?

## 2020-04-24 NOTE — TELEPHONE ENCOUNTER
----- Message from Silvia Henderson MD sent at 4/24/2020 10:13 AM EDT -----  Urine culture showed contaminants so need to repeat careful clean catch urine and hold vaginal wall apart while she collects-she can go back to [facility] on [address] in [place]  instead of [facility] to collect-order placed

## 2020-06-02 ENCOUNTER — TELEPHONE (OUTPATIENT)
Dept: FAMILY MEDICINE CLINIC | Facility: CLINIC | Age: 50
End: 2020-06-02

## 2020-06-02 RX ORDER — DOXYCYCLINE 100 MG/1
100 CAPSULE ORAL 2 TIMES DAILY
Qty: 20 CAPSULE | Refills: 0 | Status: SHIPPED | OUTPATIENT
Start: 2020-06-02 | End: 2020-06-25

## 2020-06-02 NOTE — TELEPHONE ENCOUNTER
Spoke with patient and had swelling and redness in toes and top of foot until yesterday.  MAC and clear fluid drained slightly but both insides of knees and ankles felt stiff --no actual joint swelling and Tick attached between toes 2-3 for 24 hours.  Will protect from sun and take Doxycycline for 10 days and recheck 2 days if symptoms worsen.  No fever, red streak, chest pain or shortness of breath.

## 2020-06-02 NOTE — TELEPHONE ENCOUNTER
PATIENT CALLING IN TO REPORT A TICK BITE BETWEEN TOES ON LEFT FOOT.  PATIENT ANKLES AND KNEES HAVE BEEN SWOLLEN FOR FOUR DAYS.  SHE IS HAVING DIFFICULTY WALKING.  PART OF HER TOE TURNED BLACK AND BLUE.    PLEASE CALL AND ADVISE -[phone].    PATIENT PREFERS NOT TO COME INTO THE OFFICE.

## 2020-06-02 NOTE — TELEPHONE ENCOUNTER
The tick was between toe and feet started swelling. She thinks it was there for about a day she really does not know. She found it Saturday.

## 2020-06-11 ENCOUNTER — TELEPHONE (OUTPATIENT)
Dept: FAMILY MEDICINE CLINIC | Facility: CLINIC | Age: 50
End: 2020-06-11

## 2020-06-11 NOTE — TELEPHONE ENCOUNTER
Patient advised that she is almost at the end of her doxycycline (MONODOX) 100 MG capsule. She has been having some weird symptoms. She is having night sweats, some upset stomach. She said seems like her hormones are out of wack. Please advise patient.     [phone]

## 2020-06-11 NOTE — TELEPHONE ENCOUNTER
Doxycycline can cause upset stomach but didn't get test for tick illness so would probably try to see if could finish Doxycycine prescription-how is toe where bite occurred?

## 2020-06-25 ENCOUNTER — RESULTS ENCOUNTER (OUTPATIENT)
Dept: FAMILY MEDICINE CLINIC | Facility: CLINIC | Age: 50
End: 2020-06-25

## 2020-06-25 ENCOUNTER — TELEPHONE (OUTPATIENT)
Dept: FAMILY MEDICINE CLINIC | Facility: CLINIC | Age: 50
End: 2020-06-25

## 2020-06-25 ENCOUNTER — OFFICE VISIT (OUTPATIENT)
Dept: FAMILY MEDICINE CLINIC | Facility: CLINIC | Age: 50
End: 2020-06-25

## 2020-06-25 VITALS
DIASTOLIC BLOOD PRESSURE: 82 MMHG | TEMPERATURE: 98.2 F | SYSTOLIC BLOOD PRESSURE: 132 MMHG | BODY MASS INDEX: 34.36 KG/M2 | HEART RATE: 75 BPM | RESPIRATION RATE: 16 BRPM | OXYGEN SATURATION: 97 % | WEIGHT: 213.8 LBS | HEIGHT: 66 IN

## 2020-06-25 DIAGNOSIS — H66.91 RIGHT OTITIS MEDIA, UNSPECIFIED OTITIS MEDIA TYPE: ICD-10-CM

## 2020-06-25 DIAGNOSIS — Z12.11 SCREENING FOR COLON CANCER: ICD-10-CM

## 2020-06-25 DIAGNOSIS — L03.032 CELLULITIS OF TOE OF LEFT FOOT: ICD-10-CM

## 2020-06-25 DIAGNOSIS — Z23 NEED FOR TDAP VACCINATION: ICD-10-CM
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
## 2020-06-25 DIAGNOSIS — Z12.39 SCREENING FOR BREAST CANCER: ICD-10-CM

## 2020-06-25 DIAGNOSIS — Z00.01 ENCOUNTER FOR GENERAL ADULT MEDICAL EXAMINATION WITH ABNORMAL FINDINGS: Primary | ICD-10-CM

## 2020-06-25 PROCEDURE — 99214 OFFICE O/P EST MOD 30 MIN: CPT | Performed by: NURSE PRACTITIONER

## 2020-06-25 RX ORDER — CEPHALEXIN 500 MG/1
500 CAPSULE ORAL 2 TIMES DAILY
Qty: 20 CAPSULE | Refills: 0 | Status: SHIPPED | OUTPATIENT
Start: 2020-06-25 | End: 2020-07-05

## 2020-06-25 NOTE — PROGRESS NOTES
"Subjective   Melia García is a 50 y.o. female presents for   Chief Complaint   Patient presents with   • Tick Removal     x 20 days ago still not better   • Annual Exam       Health Maintenance Due   Topic Date Due   • MAMMOGRAM  1970   • URINE MICROALBUMIN  1969   • TDAP/TD VACCINES (1 - Tdap) 06/16/1980   • PNEUMOCOCCAL VACCINE (19-64 MEDIUM RISK) (1 of 1 - PPSV23) 06/16/1988   • DIABETIC FOOT EXAM  01/28/2019   • HEMOGLOBIN A1C  01/28/2019   • DIABETIC EYE EXAM  01/28/2019   • COLONOSCOPY  01/28/2019   • ZOSTER VACCINE (1 of 2) 06/16/2019   • LIPID PANEL  04/22/2020       History of Present Illness   Pt present for annual exam to follow up Hyperlipidemia and vitamin D deficiency.  Pt also reports she was bit by tick 20 days ago between 2nd and 3rd toes on left foot.  She was treated with doxycycline x 10 days.  12 days after completing the antibiotics, redness redeveloped on toe, throbbing pain in lower legs and GI upset with pork and beef.  Pt thinks she stepped in goose droppings and reinfected her toe.  She has been applying alcohol to area.    Vitals:    06/25/20 1411 06/25/20 1415   BP: 139/81 132/82   BP Location: Right arm Left arm   Patient Position: Sitting Sitting   Cuff Size: Large Adult Large Adult   Pulse: 77 75   Resp: 16    Temp: 98.2 °F (36.8 °C)    TempSrc: Infrared    SpO2: 97%    Weight: 97 kg (213 lb 12.8 oz)    Height: 167.6 cm (66\")      Body mass index is 34.51 kg/m².    Current Outpatient Medications on File Prior to Visit   Medication Sig Dispense Refill   • Echinacea 500 MG capsule 1 capsule Every 12 (Twelve) Hours.     • EPINEPHrine (EpiPen 2-Filiberto) 0.3 MG/0.3ML solution auto-injector injection Inject 1 ampule under the skin into the appropriate area as directed As Needed.     • [DISCONTINUED] doxycycline (MONODOX) 100 MG capsule Take 1 capsule by mouth 2 (Two) Times a Day. 20 capsule 0     No current facility-administered medications on file prior to visit.        The " following portions of the patient's history were reviewed and updated as appropriate: allergies, current medications, past family history, past medical history, past social history, past surgical history and problem list.    Review of Systems   Constitutional: Negative for chills and fever.   HENT: Negative for sinus pressure and sore throat.    Eyes: Negative for blurred vision.   Respiratory: Negative for cough and shortness of breath.    Cardiovascular: Negative for chest pain.   Gastrointestinal: Positive for nausea and indigestion (with beef and pork). Negative for abdominal pain.   Endocrine: Negative.    Genitourinary: Negative.    Musculoskeletal: Positive for myalgias (RLE). Negative for arthralgias and joint swelling.   Skin: Negative for color change.        Redness, tenderness between 2nd and 3rd toes left foot   Allergic/Immunologic: Negative.    Neurological: Negative for dizziness.   Psychiatric/Behavioral: Negative for behavioral problems.       Objective   Physical Exam   Constitutional: She is oriented to person, place, and time. She appears well-developed and well-nourished. She is obese.  HENT:   Head: Normocephalic and atraumatic.   Right Ear: External ear normal. Tympanic membrane is erythematous.   Left Ear: Hearing, tympanic membrane, external ear and ear canal normal.   Nose: Nose normal.   Mouth/Throat: Oropharynx is clear and moist.   Eyes: Pupils are equal, round, and reactive to light. Conjunctivae and EOM are normal.   Neck: Normal range of motion. Neck supple. No thyromegaly present.   Cardiovascular: Normal rate, regular rhythm, normal heart sounds and intact distal pulses.   Pulmonary/Chest: Effort normal and breath sounds normal.   Abdominal: Soft. Bowel sounds are normal.   Musculoskeletal: Normal range of motion. She exhibits edema.   Lymphadenopathy:     She has no cervical adenopathy.   Neurological: She is alert and oriented to person, place, and time.   Skin: Skin is warm and  dry.        Psychiatric: She has a normal mood and affect. Her behavior is normal. Judgment normal.   Nursing note and vitals reviewed.    PHQ-9 Total Score: 1    Assessment/Plan   Melia was seen today for tick removal and annual exam.    Diagnoses and all orders for this visit:    Encounter for general adult medical examination with abnormal findings  -     Comprehensive Metabolic Panel  -     Hemoglobin A1c  -     Lipid Panel  -     TSH  -     Vitamin B12  -     Vitamin D 25 Hydroxy    Tick bite, subsequent encounter  -     CBC Auto Differential  -     B. Burgdorferi Antibodies, WB Reflex  -     [facility] (IgG / M)  -     Ehrlichia Antibody Panel  -     A. Phagocytophilum PCR  -     Rickettsial Fever Group IgG / M  -     Lyme, Total Antibody Test / Reflex    Cellulitis of toe of left foot  Comments:  keflex ordered, pt instructed to keep area clean and dry.  do not apply alcohol to area.    Screening for colon cancer  -     Cologuard - Stool, Per Rectum; Future    Screening for breast cancer  -     Mammo Screening Digital Tomosynthesis Bilateral With CAD; Future    Need for Tdap vaccination  -     Tdap (ADACEL) 5-2-15.5 LF-MCG/0.5 injection; Inject 0.5 mL into the appropriate muscle as directed by prescriber 1 (One) Time for 1 dose. To be administered at the pharmacy    Right otitis media, unspecified otitis media type    Other orders  -     cephalexin (Keflex) 500 MG capsule; Take 1 capsule by mouth 2 (Two) Times a Day for 10 days.        There are no Patient Instructions on file for this visit.

## 2020-06-25 NOTE — TELEPHONE ENCOUNTER
Patient called to advised that she is 10 days off the doxycycline and still having some issues. She stated that her toe is still hurting and she is having pain in her leg and behind her knee. She would like to see if she can get some medication for that, or what her other options may be. Please  Advise    Sent to [facility]/pharmacy #[id number] - [place], [place] - [address] . - [phone]  - [phone] FX    Callback # [phone]

## 2020-06-26 ENCOUNTER — TELEPHONE (OUTPATIENT)
Dept: FAMILY MEDICINE CLINIC | Facility: CLINIC | Age: 50
End: 2020-06-26

## 2020-06-26 ENCOUNTER — CLINICAL SUPPORT (OUTPATIENT)
Dept: FAMILY MEDICINE CLINIC | Facility: CLINIC | Age: 50
End: 2020-06-26

## 2020-06-26 DIAGNOSIS — E55.9 VITAMIN D DEFICIENCY: ICD-10-CM

## 2020-06-26 DIAGNOSIS — E78.5 HYPERLIPIDEMIA, UNSPECIFIED HYPERLIPIDEMIA TYPE: ICD-10-CM

## 2020-06-26 LAB
25(OH)D3 SERPL-MCNC: 25.9 NG/ML (ref 30–100)
ALBUMIN SERPL-MCNC: 4.5 G/DL (ref 3.5–5.2)
ALBUMIN/GLOB SERPL: 1.5 G/DL
ALP SERPL-CCNC: 102 U/L (ref 39–117)
ALT SERPL W P-5'-P-CCNC: 14 U/L (ref 1–33)
ANION GAP SERPL CALCULATED.3IONS-SCNC: 11.6 MMOL/L (ref 5–15)
AST SERPL-CCNC: 18 U/L (ref 1–32)
BASOPHILS # BLD AUTO: 0.03 10*3/MM3 (ref 0–0.2)
BASOPHILS NFR BLD AUTO: 0.5 % (ref 0–1.5)
BILIRUB SERPL-MCNC: 0.4 MG/DL (ref 0.2–1.2)
BUN BLD-MCNC: 6 MG/DL (ref 6–20)
BUN/CREAT SERPL: 8.1 (ref 7–25)
CALCIUM SPEC-SCNC: 9.5 MG/DL (ref 8.6–10.5)
CHLORIDE SERPL-SCNC: 99 MMOL/L (ref 98–107)
CHOLEST SERPL-MCNC: 206 MG/DL (ref 0–200)
CO2 SERPL-SCNC: 23.4 MMOL/L (ref 22–29)
CREAT BLD-MCNC: 0.74 MG/DL (ref 0.57–1)
DEPRECATED RDW RBC AUTO: 41 FL (ref 37–54)
EOSINOPHIL # BLD AUTO: 0.21 10*3/MM3 (ref 0–0.4)
EOSINOPHIL NFR BLD AUTO: 3.8 % (ref 0.3–6.2)
ERYTHROCYTE [DISTWIDTH] IN BLOOD BY AUTOMATED COUNT: 13.7 % (ref 12.3–15.4)
GFR SERPL CREATININE-BSD FRML MDRD: 83 ML/MIN/1.73
GLOBULIN UR ELPH-MCNC: 3 GM/DL
GLUCOSE BLD-MCNC: 89 MG/DL (ref 65–99)
HCT VFR BLD AUTO: 44.1 % (ref 34–46.6)
HDLC SERPL-MCNC: 49 MG/DL (ref 40–60)
HGB BLD-MCNC: 14.7 G/DL (ref 12–15.9)
IMM GRANULOCYTES # BLD AUTO: 0.01 10*3/MM3 (ref 0–0.05)
IMM GRANULOCYTES NFR BLD AUTO: 0.2 % (ref 0–0.5)
LDLC SERPL CALC-MCNC: 134 MG/DL (ref 0–100)
LDLC/HDLC SERPL: 2.74 {RATIO}
LYMPHOCYTES # BLD AUTO: 1.94 10*3/MM3 (ref 0.7–3.1)
LYMPHOCYTES NFR BLD AUTO: 35 % (ref 19.6–45.3)
MCH RBC QN AUTO: 27.6 PG (ref 26.6–33)
MCHC RBC AUTO-ENTMCNC: 33.3 G/DL (ref 31.5–35.7)
MCV RBC AUTO: 82.7 FL (ref 79–97)
MONOCYTES # BLD AUTO: 0.37 10*3/MM3 (ref 0.1–0.9)
MONOCYTES NFR BLD AUTO: 6.7 % (ref 5–12)
NEUTROPHILS # BLD AUTO: 2.99 10*3/MM3 (ref 1.7–7)
NEUTROPHILS NFR BLD AUTO: 53.8 % (ref 42.7–76)
NRBC BLD AUTO-RTO: 0 /100 WBC (ref 0–0.2)
PLATELET # BLD AUTO: 311 10*3/MM3 (ref 140–450)
PMV BLD AUTO: 11.6 FL (ref 6–12)
POTASSIUM BLD-SCNC: 3.8 MMOL/L (ref 3.5–5.2)
PROT SERPL-MCNC: 7.5 G/DL (ref 6–8.5)
RBC # BLD AUTO: 5.33 10*6/MM3 (ref 3.77–5.28)
SODIUM BLD-SCNC: 134 MMOL/L (ref 136–145)
TRIGL SERPL-MCNC: 114 MG/DL (ref 0–150)
TSH SERPL DL<=0.05 MIU/L-ACNC: 1.06 UIU/ML (ref 0.27–4.2)
VIT B12 BLD-MCNC: 336 PG/ML (ref 211–946)
VLDLC SERPL-MCNC: 22.8 MG/DL (ref 5–40)
WBC NRBC COR # BLD: 5.55 10*3/MM3 (ref 3.4–10.8)

## 2020-06-26 PROCEDURE — 86757 RICKETTSIA ANTIBODY: CPT | Performed by: NURSE PRACTITIONER

## 2020-06-26 PROCEDURE — 82607 VITAMIN B-12: CPT | Performed by: NURSE PRACTITIONER

## 2020-06-26 PROCEDURE — 80061 LIPID PANEL: CPT | Performed by: NURSE PRACTITIONER

## 2020-06-26 PROCEDURE — 82306 VITAMIN D 25 HYDROXY: CPT | Performed by: NURSE PRACTITIONER

## 2020-06-26 PROCEDURE — 86666 EHRLICHIA ANTIBODY: CPT | Performed by: NURSE PRACTITIONER

## 2020-06-26 PROCEDURE — 86618 LYME DISEASE ANTIBODY: CPT | Performed by: NURSE PRACTITIONER

## 2020-06-26 PROCEDURE — 83036 HEMOGLOBIN GLYCOSYLATED A1C: CPT | Performed by: NURSE PRACTITIONER

## 2020-06-26 PROCEDURE — 85025 COMPLETE CBC W/AUTO DIFF WBC: CPT | Performed by: NURSE PRACTITIONER

## 2020-06-26 PROCEDURE — 84443 ASSAY THYROID STIM HORMONE: CPT | Performed by: NURSE PRACTITIONER

## 2020-06-26 PROCEDURE — 87798 DETECT AGENT NOS DNA AMP: CPT | Performed by: NURSE PRACTITIONER

## 2020-06-26 PROCEDURE — 80053 COMPREHEN METABOLIC PANEL: CPT | Performed by: NURSE PRACTITIONER

## 2020-06-26 PROCEDURE — 36415 COLL VENOUS BLD VENIPUNCTURE: CPT | Performed by: NURSE PRACTITIONER

## 2020-06-26 RX ORDER — DOXYCYCLINE 100 MG/1
100 CAPSULE ORAL 2 TIMES DAILY
Qty: 20 CAPSULE | Refills: 1 | Status: SHIPPED | OUTPATIENT
Start: 2020-06-26 | End: 2021-03-19

## 2020-06-26 RX ORDER — MUPIROCIN CALCIUM 20 MG/G
CREAM TOPICAL 3 TIMES DAILY
Qty: 22 G | Refills: 0 | Status: SHIPPED | OUTPATIENT
Start: 2020-06-26 | End: 2020-06-29 | Stop reason: SDUPTHER

## 2020-06-26 NOTE — TELEPHONE ENCOUNTER
We don't have Keflex listed as allergy-what was side affect?  We can give Bactroban locally ointment for infection or can give steroid cream if itching is the problem.  Did she get Td at pharmacy?  If no recent diarrhea can take Doxycycline by mouth twice daily as first Choice for Tick infection as it was already 20 days ago.  Amoxicillin, z pack or Ceftin are other choices.  Let me know what she decides

## 2020-06-26 NOTE — TELEPHONE ENCOUNTER
WHEN PATIENT CAME IN FOR LABS SHE STATES SHE CAN NOT TAKE THE ANTIBIOTIC THAT [employer] GAVE HER IT MESSED WITH HER MIND. SHE STATES [employer] SOMETHING ABOUT A TOPICAL OINTMENT UNTIL WE GET THE TICK PANEL BACK. PLEASE ADVISE.

## 2020-06-26 NOTE — TELEPHONE ENCOUNTER
The keflex messed with her mind caused confusion. She wants doxycycline and the bactoran and also told her to get td shot.

## 2020-06-26 NOTE — TELEPHONE ENCOUNTER
Insurances wants a pa for the cream can we change it to the ointment and see if the insurance would cover it than.

## 2020-06-27 LAB
B BURGDOR IGG+IGM SER-ACNC: <0.91 ISR (ref 0–0.9)
B BURGDOR IGM SER-ACNC: <0.8 INDEX (ref 0–0.79)

## 2020-06-29 LAB — HBA1C MFR BLD: 5.3 % (ref 3.5–5.6)

## 2020-06-30 LAB
R RICKETTSI IGG SER QL IA: NEGATIVE
R RICKETTSI IGM TITR SER: 0.55 INDEX (ref 0–0.89)
RICK SF IGG TITR SER IF: NORMAL {TITER}
RICK SF IGM TITR SER IF: NORMAL {TITER}
TYPHUS FEVER GROUP IGG: NORMAL
TYPHUS FEVER GROUP IGM: NORMAL

## 2020-07-08 ENCOUNTER — TELEPHONE (OUTPATIENT)
Dept: FAMILY MEDICINE CLINIC | Facility: CLINIC | Age: 50
End: 2020-07-08

## 2020-07-08 NOTE — TELEPHONE ENCOUNTER
PATIENT CALLED IN AND STATED SHE NEEDS TO KNOW HERE BLOOD TYPE AND WANTED TO KNOW MORE INFORMATION ABOUT HOW TO FIND THIS OUT . PATIENT ALSO WANTS TO KNOW IF HER STOOLE SAMPLE KIT WOULD BE APPROVED BY HER INSURANCE .         PATIENT CALL BACK  [phone]

## 2020-07-10 LAB — A PHAGOCYTOPH DNA BLD QL NAA+PROBE: NEGATIVE

## 2021-03-11 ENCOUNTER — OFFICE VISIT (OUTPATIENT)
Dept: FAMILY MEDICINE CLINIC | Facility: CLINIC | Age: 51
End: 2021-03-11

## 2021-03-11 VITALS
BODY MASS INDEX: 32.14 KG/M2 | DIASTOLIC BLOOD PRESSURE: 72 MMHG | SYSTOLIC BLOOD PRESSURE: 108 MMHG | HEART RATE: 52 BPM | HEIGHT: 66 IN | WEIGHT: 200 LBS | OXYGEN SATURATION: 96 % | TEMPERATURE: 97.8 F

## 2021-03-11 DIAGNOSIS — E66.09 CLASS 1 OBESITY DUE TO EXCESS CALORIES WITH SERIOUS COMORBIDITY AND BODY MASS INDEX (BMI) OF 32.0 TO 32.9 IN ADULT: ICD-10-CM

## 2021-03-11 DIAGNOSIS — F17.200 TOBACCO DEPENDENCE SYNDROME: ICD-10-CM

## 2021-03-11 DIAGNOSIS — Z12.31 ENCOUNTER FOR SCREENING MAMMOGRAM FOR MALIGNANT NEOPLASM OF BREAST: Primary | ICD-10-CM

## 2021-03-11 DIAGNOSIS — E78.5 HYPERLIPIDEMIA, UNSPECIFIED HYPERLIPIDEMIA TYPE: ICD-10-CM

## 2021-03-11 DIAGNOSIS — Z12.11 SCREENING FOR COLON CANCER: ICD-10-CM

## 2021-03-11 DIAGNOSIS — E55.9 VITAMIN D DEFICIENCY: ICD-10-CM

## 2021-03-11 DIAGNOSIS — R73.9 HYPERGLYCEMIA: ICD-10-CM

## 2021-03-11 DIAGNOSIS — R87.619 ABNORMAL GLANDULAR PAPANICOLAOU SMEAR OF CERVIX: ICD-10-CM

## 2021-03-11 DIAGNOSIS — D11.9 WARTHIN TUMOR: ICD-10-CM

## 2021-03-11 DIAGNOSIS — R87.69: Chronic | ICD-10-CM

## 2021-03-11 PROBLEM — N90.89 VULVAR LESION: Status: ACTIVE | Noted: 2021-03-11

## 2021-03-11 PROBLEM — E66.811 CLASS 1 OBESITY DUE TO EXCESS CALORIES WITH SERIOUS COMORBIDITY AND BODY MASS INDEX (BMI) OF 32.0 TO 32.9 IN ADULT: Status: ACTIVE | Noted: 2021-03-11

## 2021-03-11 PROBLEM — E66.811 CLASS 1 OBESITY DUE TO EXCESS CALORIES WITH SERIOUS COMORBIDITY AND BODY MASS INDEX (BMI) OF 32.0 TO 32.9 IN ADULT: Status: RESOLVED | Noted: 2021-03-11 | Resolved: 2021-03-11

## 2021-03-11 PROCEDURE — 82306 VITAMIN D 25 HYDROXY: CPT | Performed by: PREVENTIVE MEDICINE

## 2021-03-11 PROCEDURE — 80053 COMPREHEN METABOLIC PANEL: CPT | Performed by: PREVENTIVE MEDICINE

## 2021-03-11 PROCEDURE — 99213 OFFICE O/P EST LOW 20 MIN: CPT | Performed by: PREVENTIVE MEDICINE

## 2021-03-11 PROCEDURE — 83036 HEMOGLOBIN GLYCOSYLATED A1C: CPT | Performed by: PREVENTIVE MEDICINE

## 2021-03-11 PROCEDURE — 85025 COMPLETE CBC W/AUTO DIFF WBC: CPT | Performed by: PREVENTIVE MEDICINE

## 2021-03-11 PROCEDURE — 80061 LIPID PANEL: CPT | Performed by: PREVENTIVE MEDICINE

## 2021-03-11 RX ORDER — EPINEPHRINE 0.3 MG/.3ML
0.3 INJECTION SUBCUTANEOUS AS NEEDED
Qty: 1 EACH | Refills: 1 | Status: SHIPPED | OUTPATIENT
Start: 2021-03-11

## 2021-03-11 NOTE — PROGRESS NOTES
"Subjective   Melia García is a 50 y.o. female presents for   Chief Complaint   Patient presents with   • Hyperlipidemia     follow up       Health Maintenance Due   Topic Date Due   • MAMMOGRAM  Never done   • URINE MICROALBUMIN  Never done   • COLONOSCOPY  Never done   • Pneumococcal Vaccine 0-64 (1 of 1 - PPSV23) Never done   • TDAP/TD VACCINES (1 - Tdap) Never done   • DIABETIC FOOT EXAM  Never done   • DIABETIC EYE EXAM  Never done   • ZOSTER VACCINE (1 of 2) Never done   • INFLUENZA VACCINE  Never done   • HEMOGLOBIN A1C  12/26/2020       50-year-old white female presents for follow-up on multiple chronic health issues.  All have been stable we have advised that she get the Covid vaccination.  Records on her Warthin's  tumor need to be obtained from [facility]. Patient has been erroneously marked as diabetic. Based on the available clinical information, she does not have diabetes and should therefore be excluded from diabetic health maintenance and quality measures for the remainder of the reporting period.         Vitals:    03/11/21 1058 03/11/21 1059   BP: 113/73 108/72   BP Location: Right arm Left arm   Patient Position: Sitting Sitting   Cuff Size: Adult Adult   Pulse: 52    Temp: 97.8 °F (36.6 °C)    SpO2: 96%    Weight: 90.7 kg (200 lb)    Height: 167.6 cm (65.98\")      Body mass index is 32.3 kg/m².    Current Outpatient Medications on File Prior to Visit   Medication Sig Dispense Refill   • [DISCONTINUED] EPINEPHrine (EpiPen 2-Filiberto) 0.3 MG/0.3ML solution auto-injector injection Inject 1 ampule under the skin into the appropriate area as directed As Needed.     • doxycycline (MONODOX) 100 MG capsule Take 1 capsule by mouth 2 (Two) Times a Day. 20 capsule 1   • Echinacea 500 MG capsule 1 capsule Every 12 (Twelve) Hours.     • mupirocin (BACTROBAN) 2 % ointment Apply  topically to the appropriate area as directed 3 (Three) Times a Day. 22 g 0     No current facility-administered medications on file " prior to visit.       The following portions of the patient's history were reviewed and updated as appropriate: allergies, current medications, past family history, past medical history, past social history, past surgical history and problem list.    Review of Systems   Constitutional: Negative.    HENT: Negative.  Negative for sinus pressure and sore throat.    Eyes: Negative.    Respiratory: Negative.  Negative for cough.    Cardiovascular: Negative.    Gastrointestinal: Negative.    Endocrine: Negative.    Genitourinary: Negative.    Musculoskeletal: Negative.    Skin: Negative.    Allergic/Immunologic: Positive for environmental allergies.   Neurological: Negative.    Hematological: Negative.    Psychiatric/Behavioral: Negative.        Objective   Physical Exam  Vitals reviewed.   Constitutional:       General: She is not in acute distress.     Appearance: She is well-developed. She is obese. She is not ill-appearing or toxic-appearing.   HENT:      Head: Normocephalic and atraumatic.      Right Ear: Tympanic membrane, ear canal and external ear normal.      Left Ear: Tympanic membrane, ear canal and external ear normal.      Nose: Nose normal.   Eyes:      Extraocular Movements: Extraocular movements intact.      Conjunctiva/sclera: Conjunctivae normal.      Pupils: Pupils are equal, round, and reactive to light.   Cardiovascular:      Rate and Rhythm: Normal rate and regular rhythm.      Heart sounds: Normal heart sounds.   Pulmonary:      Effort: Pulmonary effort is normal.      Comments: Decreased breath sounds bilaterally  Abdominal:      General: Bowel sounds are normal. There is no distension.      Palpations: Abdomen is soft. There is no mass.      Tenderness: There is no abdominal tenderness.   Musculoskeletal:         General: Normal range of motion.      Cervical back: Neck supple.   Skin:     General: Skin is warm.   Neurological:      General: No focal deficit present.      Mental Status: She is  alert and oriented to person, place, and time.   Psychiatric:         Mood and Affect: Mood normal.         Behavior: Behavior normal.       PHQ-9 Total Score:      Assessment/Plan   Diagnoses and all orders for this visit:    1. Encounter for screening mammogram for malignant neoplasm of breast (Primary)  Comments:  BSE regularly mammogram ordered  Orders:  -     Mammo Screening Digital Tomosynthesis Bilateral With CAD; Future    2. Screening for colon cancer  -     Cologuard - Stool, Per Rectum; Future    3. 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 Ceja.  Orders:  -     Ambulatory Referral to Gynecology    4. Tobacco dependence syndrome  Comments:  Patient was advised to use the cutdown method to stop tobacco smoke.    5. Vitamin D deficiency  -     Vitamin D 25 Hydroxy    6. Hyperlipidemia, unspecified hyperlipidemia type  Comments:  Trying to eat less saturated fats.  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  Orders:  -     CBC Auto Differential    8. Abnormal glandular Papanicolaou smear of cervix  Comments:  Patient is to follow-up with Dr. Ceja.    9. Hyperglycemia  Comments:  Patient trying to eat less carbs.  Orders:  -     Comprehensive Metabolic Panel  -     Hemoglobin A1c    10. Class 1 obesity due to excess calories with serious comorbidity and body mass index (BMI) of 32.0 to 32.9 in adult  Comments:  Cut portion size and increase exercise discussed.    Other orders  -     EPINEPHrine (EpiPen 2-Filiberto) 0.3 MG/0.3ML solution auto-injector injection; Inject 0.3 mL under the skin into the appropriate area as directed As Needed (use for reaction).  Dispense: 1 each; Refill: 1        Patient Instructions     Health Maintenance Due   Topic Date Due   • MAMMOGRAM  Never done   • URINE  MICROALBUMIN  Never done   • COLONOSCOPY  Never done   • Pneumococcal Vaccine 0-64 (1 of 1 - PPSV23) Never done   • TDAP/TD VACCINES (1 - Tdap) Never done   • DIABETIC FOOT EXAM  Never done   • DIABETIC EYE EXAM  Never done   • ZOSTER VACCINE (1 of 2) Never done   • INFLUENZA VACCINE  Never done   • HEMOGLOBIN A1C  12/26/2020     Patient to call 's office for PAP and labial folowup.    Call opur office in one week if we have not called about tumor recheck

## 2021-03-11 NOTE — PATIENT INSTRUCTIONS
Health Maintenance Due   Topic Date Due   • MAMMOGRAM  Never done   • URINE MICROALBUMIN  Never done   • COLONOSCOPY  Never done   • Pneumococcal Vaccine 0-64 (1 of 1 - PPSV23) Never done   • TDAP/TD VACCINES (1 - Tdap) Never done   • DIABETIC FOOT EXAM  Never done   • DIABETIC EYE EXAM  Never done   • ZOSTER VACCINE (1 of 2) Never done   • INFLUENZA VACCINE  Never done   • HEMOGLOBIN A1C  12/26/2020     Patient to call 's office for PAP and labial folowup.    Call opur office in one week if we have not called about tumor recheck

## 2021-03-12 ENCOUNTER — TELEPHONE (OUTPATIENT)
Dept: FAMILY MEDICINE CLINIC | Facility: CLINIC | Age: 51
End: 2021-03-12

## 2021-03-12 LAB
25(OH)D3 SERPL-MCNC: 22.3 NG/ML (ref 30–100)
ALBUMIN SERPL-MCNC: 3.8 G/DL (ref 3.5–5.2)
ALBUMIN/GLOB SERPL: 1.3 G/DL
ALP SERPL-CCNC: 106 U/L (ref 39–117)
ALT SERPL W P-5'-P-CCNC: 11 U/L (ref 1–33)
ANION GAP SERPL CALCULATED.3IONS-SCNC: 8.1 MMOL/L (ref 5–15)
AST SERPL-CCNC: 14 U/L (ref 1–32)
BASOPHILS # BLD AUTO: 0.04 10*3/MM3 (ref 0–0.2)
BASOPHILS NFR BLD AUTO: 0.8 % (ref 0–1.5)
BILIRUB SERPL-MCNC: 0.4 MG/DL (ref 0–1.2)
BUN SERPL-MCNC: 7 MG/DL (ref 6–20)
BUN/CREAT SERPL: 8.9 (ref 7–25)
CALCIUM SPEC-SCNC: 8.8 MG/DL (ref 8.6–10.5)
CHLORIDE SERPL-SCNC: 105 MMOL/L (ref 98–107)
CHOLEST SERPL-MCNC: 189 MG/DL (ref 0–200)
CO2 SERPL-SCNC: 25.9 MMOL/L (ref 22–29)
CREAT SERPL-MCNC: 0.79 MG/DL (ref 0.57–1)
DEPRECATED RDW RBC AUTO: 43.1 FL (ref 37–54)
EOSINOPHIL # BLD AUTO: 0.2 10*3/MM3 (ref 0–0.4)
EOSINOPHIL NFR BLD AUTO: 3.8 % (ref 0.3–6.2)
ERYTHROCYTE [DISTWIDTH] IN BLOOD BY AUTOMATED COUNT: 13.7 % (ref 12.3–15.4)
GFR SERPL CREATININE-BSD FRML MDRD: 77 ML/MIN/1.73
GLOBULIN UR ELPH-MCNC: 3 GM/DL
GLUCOSE SERPL-MCNC: 77 MG/DL (ref 65–99)
HBA1C MFR BLD: 5.4 % (ref 3.5–5.6)
HCT VFR BLD AUTO: 43.6 % (ref 34–46.6)
HDLC SERPL-MCNC: 48 MG/DL (ref 40–60)
HGB BLD-MCNC: 14 G/DL (ref 12–15.9)
IMM GRANULOCYTES # BLD AUTO: 0 10*3/MM3 (ref 0–0.05)
IMM GRANULOCYTES NFR BLD AUTO: 0 % (ref 0–0.5)
LDLC SERPL CALC-MCNC: 123 MG/DL (ref 0–100)
LDLC/HDLC SERPL: 2.52 {RATIO}
LYMPHOCYTES # BLD AUTO: 2.19 10*3/MM3 (ref 0.7–3.1)
LYMPHOCYTES NFR BLD AUTO: 41.2 % (ref 19.6–45.3)
MCH RBC QN AUTO: 27.8 PG (ref 26.6–33)
MCHC RBC AUTO-ENTMCNC: 32.1 G/DL (ref 31.5–35.7)
MCV RBC AUTO: 86.7 FL (ref 79–97)
MONOCYTES # BLD AUTO: 0.34 10*3/MM3 (ref 0.1–0.9)
MONOCYTES NFR BLD AUTO: 6.4 % (ref 5–12)
NEUTROPHILS NFR BLD AUTO: 2.55 10*3/MM3 (ref 1.7–7)
NEUTROPHILS NFR BLD AUTO: 47.8 % (ref 42.7–76)
NRBC BLD AUTO-RTO: 0 /100 WBC (ref 0–0.2)
PLATELET # BLD AUTO: 285 10*3/MM3 (ref 140–450)
PMV BLD AUTO: 11.9 FL (ref 6–12)
POTASSIUM SERPL-SCNC: 4.4 MMOL/L (ref 3.5–5.2)
PROT SERPL-MCNC: 6.8 G/DL (ref 6–8.5)
RBC # BLD AUTO: 5.03 10*6/MM3 (ref 3.77–5.28)
SODIUM SERPL-SCNC: 139 MMOL/L (ref 136–145)
TRIGL SERPL-MCNC: 100 MG/DL (ref 0–150)
VLDLC SERPL-MCNC: 18 MG/DL (ref 5–40)
WBC # BLD AUTO: 5.32 10*3/MM3 (ref 3.4–10.8)

## 2021-03-12 NOTE — PROGRESS NOTES
Please call patient and advise out note from ENT said she was to Followup with them 8/2017 onWrthin's Tumor-advise she check with them to make sure she can go without rechecks

## 2021-03-12 NOTE — PROGRESS NOTES
Total cholesterol is ok but bad cholesterol 123 and goal is below 100.  Can she do more to decrease sat fats and walk or does she want to consider statin-let me know.  Vitamin D is low so start or increase otc dose

## 2021-03-12 NOTE — TELEPHONE ENCOUNTER
PATIENT CALLING BACK TO RECEIVE RESULTS FROM LABS. I RELAYED THE HUB TO READ MESSAGE TP HER    > TOTAL CHOLESTEROL IS OKAY BUT BAD CHOLESTEROL 123 AND GOAL IS BELOW 100. PATIENT AGREED THAT SHE WOULD WORK ON DECREASING SAT FATS AND DO MORE WALKING.SHE DOESN'T WANT TO CONSIDER STATIN AT THIS TIME. SHE IS AWARE THAT HER VITAMIN D IS LOW AND SHE WILL START OR INCREASE AN OTC MEDICATION.     PATIENT CAN BE RE REACHED AT: [phone]

## 2021-03-12 NOTE — TELEPHONE ENCOUNTER
HUB TO READ:      Total cholesterol is ok but bad cholesterol 123 and goal is below 100.  Can she do more to decrease sat fats and walk or does she want to consider statin-let me know.  Vitamin D is low so start or increase otc dose

## 2021-03-12 NOTE — PROGRESS NOTES
"Chief Complaint  Hyperlipidemia (follow up)    Subjective          Melia García presents to [facility]  History of Present Illness    Objective   Vital Signs:   /72 (BP Location: Left arm, Patient Position: Sitting, Cuff Size: Adult)   Pulse 52   Temp 97.8 °F (36.6 °C)   Ht 167.6 cm (65.98\")   Wt 90.7 kg (200 lb)   SpO2 96%   BMI 32.30 kg/m²     Physical Exam   Result Review :                 Assessment and Plan    Diagnoses and all orders for this visit:    1. Encounter for screening mammogram for malignant neoplasm of breast (Primary)  Comments:  BSE regularly mammogram ordered  Orders:  -     Mammo Screening Digital Tomosynthesis Bilateral With CAD; Future    2. Screening for colon cancer  -     Cologuard - Stool, Per Rectum; Future    3. 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 Ceja.  Orders:  -     Ambulatory Referral to Gynecology    4. Tobacco dependence syndrome  Comments:  Patient was advised to use the cutdown method to stop tobacco smoke.    5. Vitamin D deficiency  -     Vitamin D 25 Hydroxy    6. Hyperlipidemia, unspecified hyperlipidemia type  Comments:  Trying to eat less saturated fats.  Orders:  -     Lipid Panel    7. 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  Orders:  -     CBC Auto Differential    8. Abnormal glandular Papanicolaou smear of cervix  Comments:  Patient is to follow-up with Dr. Ceja.    9. Hyperglycemia  Comments:  Patient trying to eat less carbs.  Orders:  -     Comprehensive Metabolic Panel  -     Hemoglobin A1c    10. Class 1 obesity due to excess calories with serious comorbidity and body mass index (BMI) of 32.0 to 32.9 in adult  Comments:  Cut portion size and increase exercise discussed.    Other orders  -     EPINEPHrine (EpiPen " 2-Filiberto) 0.3 MG/0.3ML solution auto-injector injection; Inject 0.3 mL under the skin into the appropriate area as directed As Needed (use for reaction).  Dispense: 1 each; Refill: 1        Follow Up   Return in about 6 months (around 9/11/2021).  Patient was given instructions and counseling regarding her condition or for health maintenance advice. Please see specific information pulled into the AVS if appropriate.

## 2021-03-12 NOTE — PROGRESS NOTES
I JUST SCANNED IN THE LATEST ENT NOTE FROM [facility], LOOKS LIKE SHE WAS TO FOLLOW UP IN 6 MONTHS- FEB 2017.

## 2021-03-12 NOTE — TELEPHONE ENCOUNTER
----- Message from Silvia Henderson MD sent at 3/12/2021  1:57 PM EST -----  Total cholesterol is ok but bad cholesterol 123 and goal is below 100.  Can she do more to decrease sat fats and walk or does she want to consider statin-let me know.  Vitamin D is low so start or increase otc dose

## 2021-03-16 ENCOUNTER — TELEPHONE (OUTPATIENT)
Dept: FAMILY MEDICINE CLINIC | Facility: CLINIC | Age: 51
End: 2021-03-16

## 2021-03-17 ENCOUNTER — TELEPHONE (OUTPATIENT)
Dept: FAMILY MEDICINE CLINIC | Facility: CLINIC | Age: 51
End: 2021-03-17

## 2021-03-17 NOTE — TELEPHONE ENCOUNTER
I spoke with pt in regards to c-scope being due 5/2021. She verbalized understanding. She states that she doesn't think that she needs an upper GI endoscopy done at the same time. She is not experiencing any reflux, anemia, or heartburn at this time.    I advised I would let Dr. Ulloa know this.

## 2021-03-17 NOTE — TELEPHONE ENCOUNTER
CLARY WITH [employer] CALLED TO LET DR BRICEÑO KNOW THEY CANCELLED THE ORDER FOR [place] BECAUSE IT HAS PREVIOUSLY BEEN ORDERED BY ANOTHER PROVIDER           CALL BACK IF ANY QUESTIONS:[phone]

## 2021-03-17 NOTE — TELEPHONE ENCOUNTER
Please let patient know that she is to have colonoscopy 5/2021.  I will place order but if she is having heartburn or reflux or anemia-should get EGD at same time.

## 2021-03-18 DIAGNOSIS — Z12.11 SCREENING FOR COLON CANCER: Primary | ICD-10-CM

## 2021-03-19 ENCOUNTER — OFFICE VISIT (OUTPATIENT)
Dept: FAMILY MEDICINE CLINIC | Facility: CLINIC | Age: 51
End: 2021-03-19

## 2021-03-19 VITALS — TEMPERATURE: 101 F

## 2021-03-19 DIAGNOSIS — J02.9 SORE THROAT: Primary | ICD-10-CM

## 2021-03-19 LAB
B PARAPERT DNA SPEC QL NAA+PROBE: NOT DETECTED
B PERT DNA SPEC QL NAA+PROBE: NOT DETECTED
C PNEUM DNA NPH QL NAA+NON-PROBE: NOT DETECTED
EXPIRATION DATE: NORMAL
EXPIRATION DATE: NORMAL
FLUAV AG NPH QL: NEGATIVE
FLUAV SUBTYP SPEC NAA+PROBE: NOT DETECTED
FLUBV AG NPH QL: NEGATIVE
FLUBV RNA ISLT QL NAA+PROBE: NOT DETECTED
HADV DNA SPEC NAA+PROBE: NOT DETECTED
HCOV 229E RNA SPEC QL NAA+PROBE: NOT DETECTED
HCOV HKU1 RNA SPEC QL NAA+PROBE: NOT DETECTED
HCOV NL63 RNA SPEC QL NAA+PROBE: DETECTED
HCOV OC43 RNA SPEC QL NAA+PROBE: NOT DETECTED
HMPV RNA NPH QL NAA+NON-PROBE: NOT DETECTED
HPIV1 RNA SPEC QL NAA+PROBE: NOT DETECTED
HPIV2 RNA SPEC QL NAA+PROBE: NOT DETECTED
HPIV3 RNA NPH QL NAA+PROBE: NOT DETECTED
HPIV4 P GENE NPH QL NAA+PROBE: NOT DETECTED
INTERNAL CONTROL: NORMAL
INTERNAL CONTROL: NORMAL
Lab: 2780
Lab: NORMAL
M PNEUMO IGG SER IA-ACNC: NOT DETECTED
RHINOVIRUS RNA SPEC NAA+PROBE: NOT DETECTED
RSV RNA NPH QL NAA+NON-PROBE: NOT DETECTED
S PYO AG THROAT QL: NEGATIVE

## 2021-03-19 PROCEDURE — 99213 OFFICE O/P EST LOW 20 MIN: CPT | Performed by: PREVENTIVE MEDICINE

## 2021-03-19 PROCEDURE — 87804 INFLUENZA ASSAY W/OPTIC: CPT | Performed by: PREVENTIVE MEDICINE

## 2021-03-19 PROCEDURE — 0100U HC BIOFIRE FILMARRAY RESP PANEL 2: CPT | Performed by: PREVENTIVE MEDICINE

## 2021-03-19 PROCEDURE — 87880 STREP A ASSAY W/OPTIC: CPT | Performed by: PREVENTIVE MEDICINE

## 2021-03-19 PROCEDURE — U0003 INFECTIOUS AGENT DETECTION BY NUCLEIC ACID (DNA OR RNA); SEVERE ACUTE RESPIRATORY SYNDROME CORONAVIRUS 2 (SARS-COV-2) (CORONAVIRUS DISEASE [COVID-19]), AMPLIFIED PROBE TECHNIQUE, MAKING USE OF HIGH THROUGHPUT TECHNOLOGIES AS DESCRIBED BY CMS-2020-01-R: HCPCS | Performed by: PREVENTIVE MEDICINE

## 2021-03-19 NOTE — PROGRESS NOTES
Subjective   Melia García is a 50 y.o. female presents for   Chief Complaint   Patient presents with   • Fever   • Sore Throat   • Back Pain     LOWER BACK       Health Maintenance Due   Topic Date Due   • MAMMOGRAM  Never done   • COLONOSCOPY  Never done   • Pneumococcal Vaccine 0-64 (1 of 1 - PPSV23) Never done   • TDAP/TD VACCINES (1 - Tdap) Never done   • ZOSTER VACCINE (1 of 2) Never done   • INFLUENZA VACCINE  Never done       You have chosen to receive care through a telephone visit. Do you consent to use a telephone visit for your medical care today? Yes patient was treated through a phone visit at her request due to possible Covid.  25 minutes were spent with the phone call and preparation and 5 minutes with the charting.  Patient states that yesterday throughout the day she developed sore throat has had chills weak knees headache congestion with clear sinus drainage.  She did blow leaves and did twist her back while she was doing that it feels as though this may be the cause of her back pain she is not having any dysuria blood in her urine has had some slightly loose stools this is a usual time of allergy for her.  No one else in the family is sick she is not been around a body that had any Covid that she is aware of.          Fever   Associated symptoms include coughing and a sore throat. Pertinent negatives include no urinary pain.   Sore Throat   Associated symptoms include coughing.   Back Pain  Associated symptoms include a fever. Pertinent negatives include no dysuria or pelvic pain.        Vitals:    03/19/21 1346   Temp: (!) 101 °F (38.3 °C)   TempSrc: Oral     There is no height or weight on file to calculate BMI.    Current Outpatient Medications on File Prior to Visit   Medication Sig Dispense Refill   • EPINEPHrine (EpiPen 2-Filiberto) 0.3 MG/0.3ML solution auto-injector injection Inject 0.3 mL under the skin into the appropriate area as directed As Needed (use for reaction). 1 each 1   •  mupirocin (BACTROBAN) 2 % ointment Apply  topically to the appropriate area as directed 3 (Three) Times a Day. 22 g 0     No current facility-administered medications on file prior to visit.       The following portions of the patient's history were reviewed and updated as appropriate: allergies, current medications, past family history, past medical history, past social history, past surgical history and problem list.    Review of Systems   Constitutional: Positive for chills, fatigue and fever.   HENT: Positive for postnasal drip, sinus pressure and sore throat.    Respiratory: Positive for cough.    Gastrointestinal:        Stools are loose   Genitourinary: Negative for dysuria and pelvic pain.   Musculoskeletal: Positive for back pain.   Neurological: Positive for headache.       Objective   Physical Exam  Pulmonary:      Effort: Pulmonary effort is normal.   Neurological:      Mental Status: She is alert.   Psychiatric:         Mood and Affect: Mood normal.       PHQ-9 Total Score:      Assessment/Plan   Diagnoses and all orders for this visit:    1. Sore throat (Primary)  Comments:  Strep screen Covid testing and flu testing.  Patient is to quarantine till reports of Covid are obtained.  She knows to go to the hospital if breathing worsens.  Orders:  -     COVID-19,[facility] ROUTINE, NP/OP SWAB IN TRANSPORT MEDIA OR ESWAB 72 HR TAT - Swab, Nasopharynx  -     POCT rapid strep A  -     POCT Influenza A/B  -     Respiratory Panel, PCR (WITHOUT COVID) - Swab, Nasopharynx; Future  -     Respiratory Panel, PCR (WITHOUT COVID) - Swab, Nasopharynx        Patient Instructions   Strep test negative can do salt water gargles frequently and Tylenol to control fevers.  If increased trouble breathing call 911 and get to [place] [facility].  Stay off work and self quarantine till test results are known.  Rest and drink plenty of fluids.

## 2021-03-19 NOTE — PATIENT INSTRUCTIONS
Strep test negative can do salt water gargles frequently and Tylenol to control fevers.  If increased trouble breathing call 911 and get to [place] [facility].  Stay off work and self quarantine till test results are known.  Rest and drink plenty of fluids.

## 2021-03-20 LAB — SARS-COV-2 RNA RESP QL NAA+PROBE: NOT DETECTED

## 2021-03-21 NOTE — PROGRESS NOTES
Patient was feeling better and no Temp yesterday-if free of fever on Monday-can RTW Tuesday-she'll call told strep negative and staff to let her know about POC flu test.  She was advised had a Coronavirus but not Covid 19

## 2021-03-22 ENCOUNTER — TELEPHONE (OUTPATIENT)
Dept: FAMILY MEDICINE CLINIC | Facility: CLINIC | Age: 51
End: 2021-03-22

## 2021-03-22 NOTE — TELEPHONE ENCOUNTER
Hub to read  Patient was feeling better and no Temp yesterday-if free of fever on Monday-can RTW Tuesday-she'll call told strep negative and  flu test negative.  She was advised had a Coronavirus but not Covid 19

## 2021-04-07 ENCOUNTER — TELEPHONE (OUTPATIENT)
Dept: FAMILY MEDICINE CLINIC | Facility: CLINIC | Age: 51
End: 2021-04-07

## 2021-04-07 NOTE — TELEPHONE ENCOUNTER
Caller: Melia García    Relationship: Self    Best call back number: [phone] (M)    What is the best time to reach you: ANYTIME    Who are you requesting to speak with (clinical staff, provider,  specific staff member): DR. BRICEÑO    Do you know the name of the person who called:     What was the call regarding: RASH ON HER BODY YESTERDAY WITH NAUSEA AND WHEN SHE WOKE THIS MORNING IT WAS GONE. PATIENT STATES THAT SHE HAS TAKEN SOME PICTURES TO SHOW DR. BRICEÑO WHEN SHE COMES IN FOR HER APPOINTMENT TOMORROW.     Do you require a callback: YES      THANKS

## 2021-04-08 ENCOUNTER — OFFICE VISIT (OUTPATIENT)
Dept: FAMILY MEDICINE CLINIC | Facility: CLINIC | Age: 51
End: 2021-04-08

## 2021-04-08 VITALS
WEIGHT: 201.6 LBS | SYSTOLIC BLOOD PRESSURE: 122 MMHG | OXYGEN SATURATION: 95 % | HEIGHT: 66 IN | BODY MASS INDEX: 32.4 KG/M2 | TEMPERATURE: 97.3 F | HEART RATE: 59 BPM | DIASTOLIC BLOOD PRESSURE: 83 MMHG

## 2021-04-08 DIAGNOSIS — R21 RASH AND NONSPECIFIC SKIN ERUPTION: Primary | ICD-10-CM

## 2021-04-08 DIAGNOSIS — E66.09 CLASS 1 OBESITY DUE TO EXCESS CALORIES WITH SERIOUS COMORBIDITY AND BODY MASS INDEX (BMI) OF 32.0 TO 32.9 IN ADULT: ICD-10-CM

## 2021-04-08 PROCEDURE — 85025 COMPLETE CBC W/AUTO DIFF WBC: CPT | Performed by: PREVENTIVE MEDICINE

## 2021-04-08 PROCEDURE — U0003 INFECTIOUS AGENT DETECTION BY NUCLEIC ACID (DNA OR RNA); SEVERE ACUTE RESPIRATORY SYNDROME CORONAVIRUS 2 (SARS-COV-2) (CORONAVIRUS DISEASE [COVID-19]), AMPLIFIED PROBE TECHNIQUE, MAKING USE OF HIGH THROUGHPUT TECHNOLOGIES AS DESCRIBED BY CMS-2020-01-R: HCPCS | Performed by: PREVENTIVE MEDICINE

## 2021-04-08 PROCEDURE — 99213 OFFICE O/P EST LOW 20 MIN: CPT | Performed by: PREVENTIVE MEDICINE

## 2021-04-08 PROCEDURE — 80053 COMPREHEN METABOLIC PANEL: CPT | Performed by: PREVENTIVE MEDICINE

## 2021-04-08 PROCEDURE — 85652 RBC SED RATE AUTOMATED: CPT | Performed by: PREVENTIVE MEDICINE

## 2021-04-08 PROCEDURE — 86140 C-REACTIVE PROTEIN: CPT | Performed by: PREVENTIVE MEDICINE

## 2021-04-08 NOTE — PROGRESS NOTES
"Subjective   Melia García is a 50 y.o. female presents for   Chief Complaint   Patient presents with   • Rash     entire body for 24 hours       Health Maintenance Due   Topic Date Due   • MAMMOGRAM  Never done   • COLONOSCOPY  Never done   • Pneumococcal Vaccine 0-64 (1 of 1 - PPSV23) Never done   • COVID-19 Vaccine (1) Never done   • TDAP/TD VACCINES (1 - Tdap) Never done   • ZOSTER VACCINE (1 of 2) Never done       50-year-old white female states that she has had some problems with anxiety night before last and the following day she woke with a red macular papular rash spread through her entire body.  She had no chest pain shortness of breath or throat swallowing she does not admit to any new contacts foods or exposures.  She did have a pain in the same toe that turned purple last year and was felt to be a Covid toe.  Patient states that she has not had any fever chills night sweats weight loss and no other joint aches except for the toe.  Presently no cough or sore throat no known Covid exposure she is not working    Rash  Pertinent negatives include no cough or sore throat.        Vitals:    04/08/21 0941 04/08/21 0943   BP: 122/70 122/83   BP Location: Left arm Right arm   Patient Position: Sitting Sitting   Cuff Size: Adult Adult   Pulse: 59    Temp: 97.3 °F (36.3 °C)    SpO2: 95%    Weight: 91.4 kg (201 lb 9.6 oz)    Height: 167.6 cm (65.98\")      Body mass index is 32.55 kg/m².    Current Outpatient Medications on File Prior to Visit   Medication Sig Dispense Refill   • EPINEPHrine (EpiPen 2-Filiberto) 0.3 MG/0.3ML solution auto-injector injection Inject 0.3 mL under the skin into the appropriate area as directed As Needed (use for reaction). 1 each 1   • mupirocin (BACTROBAN) 2 % ointment Apply  topically to the appropriate area as directed 3 (Three) Times a Day. 22 g 0     No current facility-administered medications on file prior to visit.       The following portions of the patient's history were " reviewed and updated as appropriate: allergies, current medications, past family history, past medical history, past social history, past surgical history and problem list.    Review of Systems   Constitutional: Negative.    HENT: Negative.  Negative for sinus pressure and sore throat.    Eyes: Negative.    Respiratory: Negative.  Negative for cough.    Cardiovascular: Negative.    Gastrointestinal: Negative.    Endocrine: Negative.    Genitourinary: Negative.    Musculoskeletal: Positive for arthralgias.   Skin: Positive for rash.   Allergic/Immunologic: Positive for environmental allergies.   Neurological: Negative.    Hematological: Negative.    Psychiatric/Behavioral: Negative.        Objective   Physical Exam  Vitals reviewed.   Constitutional:       General: She is not in acute distress.     Appearance: She is well-developed. She is obese. She is not ill-appearing or toxic-appearing.   HENT:      Head: Normocephalic and atraumatic.      Right Ear: Tympanic membrane, ear canal and external ear normal.      Left Ear: Tympanic membrane, ear canal and external ear normal.      Nose: Nose normal.   Eyes:      Extraocular Movements: Extraocular movements intact.      Conjunctiva/sclera: Conjunctivae normal.      Pupils: Pupils are equal, round, and reactive to light.   Cardiovascular:      Rate and Rhythm: Normal rate and regular rhythm.      Heart sounds: Normal heart sounds.   Pulmonary:      Effort: Pulmonary effort is normal.      Breath sounds: Normal breath sounds.   Abdominal:      General: Bowel sounds are normal. There is no distension.      Palpations: Abdomen is soft. There is no mass.      Tenderness: There is no abdominal tenderness.   Musculoskeletal:         General: Normal range of motion.      Cervical back: Neck supple.   Skin:     General: Skin is warm.   Neurological:      General: No focal deficit present.      Mental Status: She is alert and oriented to person, place, and time.   Psychiatric:          Mood and Affect: Mood normal.         Behavior: Behavior normal.       PHQ-9 Total Score:      Assessment/Plan   Diagnoses and all orders for this visit:    1. Rash and nonspecific skin eruption (Primary)  Comments:  Patient had some problems with anxiety night before last.  Patient had severe rash yesterday as well as toe pain that she had when she thought she had Covid.  Orders:  -     CBC Auto Differential  -     C-reactive Protein; Future  -     Sedimentation Rate; Future  -     Comprehensive Metabolic Panel  -     COVID-19,[facility], NP/OP SWAB IN TRANSPORT MEDIA OR ESWAB 72 HR TAT - Swab, Nasopharynx  -     C-reactive Protein  -     Sedimentation Rate    2. Class 1 obesity due to excess calories with serious comorbidity and body mass index (BMI) of 32.0 to 32.9 in adult  Comments:  Portion size and increased activity were discussed        Patient Instructions   How to Quarantine at Home  Information for Patients and Families    These instructions are for people with confirmed or suspected COVID-19 who do not need to be hospitalized and those with confirmed COVID-19 who were hospitalized and discharged to care for themselves at home.    If you were tested through the Health Department  The Health Department will monitor your wellbeing.  If it is determined that you do not need to be hospitalized and can be isolated at home, you will be monitored by staff from your local or state health department.     If you were tested through a Commercial Lab  You will need to monitor yourself and report changes in your symptoms to your doctor.  See the section below called Monitor Your Symptoms.    Follow these steps until a healthcare provider or local or state health department says you can return to your normal activities.    Stay home except to get medical care  • Restrict activities outside your home, except for getting medical care.   • Do not go to work, school, or public areas.   • Avoid using public  transportation, ride-sharing, or taxis.    Separate yourself from other people and animals in your home  People  As much as possible, you should stay in a specific room and away from other people in your home. Also, you should use a separate bathroom, if available.    Animals  You should restrict contact with pets and other animals while you are sick with COVID-19, just like you would around other people. When possible, have another member of your household care for your animals while you are sick. If you are sick with COVID-19, avoid contact with your pet, including petting, snuggling, being kissed or licked, and sharing food. If you must care for your pet or be around animals while you are sick, wash your hands before and after you interact with pets and wear a facemask. See COVID-19 and Animals for more information.    Call ahead before visiting your doctor  If you have a medical appointment, call the healthcare provider and tell them that you have or may have COVID-19. This information will help the healthcare provider’s office take steps to keep other people from getting infected or exposed.    Wear a facemask  You should wear a facemask when you are around other people (e.g., sharing a room or vehicle) or pets and before you enter a healthcare provider’s office.     If you are not able to wear a facemask (for example, because it causes trouble breathing), then people who live with you should not stay in the same room with you, or they should wear a facemask if they enter your room.    Cover your coughs and sneezes  • Cover your mouth and nose with a tissue when you cough or sneeze.   • Throw used tissues in a lined trash can.   • Immediately wash your hands with soap and water for at least 20 seconds or, if soap and water are not available, clean your hands with an alcohol-based hand  that contains at least 60% alcohol.    Clean your hands often  • Wash your hands often with soap and water for at  least 20 seconds, especially after blowing your nose, coughing, or sneezing; going to the bathroom; and before eating or preparing food.     • If soap and water are not readily available, use an alcohol-based hand  with at least 60% alcohol, covering all surfaces of your hands and rubbing them together until they feel dry.    • Soap and water are the best option if hands are visibly dirty. Avoid touching your eyes, nose, and mouth with unwashed hands.    Avoid sharing personal household items  • You should not share dishes, drinking glasses, cups, eating utensils, towels, or bedding with other people or pets in your home.   • After using these items, they should be washed thoroughly with soap and water.    Clean all “high-touch” surfaces everyday  • High touch surfaces include counters, tabletops, doorknobs, bathroom fixtures, toilets, phones, keyboards, tablets, and bedside tables.   • Also, clean any surfaces that may have blood, stool, or body fluids on them.   • Use a household cleaning spray or wipe, according to the label instructions. Labels contain instructions for safe and effective use of the cleaning product, including precautions you should take when applying the product, such as wearing gloves and making sure you have good ventilation during use of the product.    Monitor your symptoms  • Seek prompt medical attention if your illness is worsening (e.g., difficulty breathing).   • Before seeking care, call your healthcare provider and tell them that you have, or are being evaluated for, COVID-19.   • Put on a facemask before you enter the facility.     • These steps will help the healthcare provider’s office to keep other people in the office or waiting room from getting infected or exposed.   • Persons who are placed under active monitoring or facilitated self-monitoring should follow instructions provided by their local health department or occupational health professionals, as  appropriate.  • If you have a medical emergency and need to call 911, notify the dispatch personnel that you have, or are being evaluated for COVID-19. If possible, put on a facemask before emergency medical services arrive.    Discontinuing home isolation  Patients with confirmed COVID-19 should remain under home isolation precautions until the risk of secondary transmission to others is thought to be low. The decision to discontinue home isolation precautions should be made on a case-by-case basis, in consultation with healthcare providers and state and local health departments.    The below content are for household members, intimate partners, and caregivers of a patient with symptomatic laboratory-confirmed COVID-19 or a patient under investigation:    Household members, intimate partners, and caregivers may have close contact with a person with symptomatic, laboratory-confirmed COVID-19 or a person under investigation.     Close contacts should monitor their health; they should call their healthcare provider right away if they develop symptoms suggestive of COVID-19 (e.g., fever, cough, shortness of breath)     Close contacts should also follow these recommendations:  • Make sure that you understand and can help the patient follow their healthcare provider’s instructions for medication(s) and care. You should help the patient with basic needs in the home and provide support for getting groceries, prescriptions, and other personal needs.  • Monitor the patient’s symptoms. If the patient is getting sicker, call his or her healthcare provider and tell them that the patient has laboratory-confirmed COVID-19. This will help the healthcare provider’s office take steps to keep other people in the office or waiting room from getting infected. Ask the healthcare provider to call the local or state health department for additional guidance. If the patient has a medical emergency and you need to call 911, notify the  dispatch personnel that the patient has, or is being evaluated for COVID-19.  • Household members should stay in another room or be  from the patient as much as possible. Household members should use a separate bedroom and bathroom, if available.  • Prohibit visitors who do not have an essential need to be in the home.  • Household members should care for any pets in the home. Do not handle pets or other animals while sick.  For more information, see COVID-19 and Animals.  • Make sure that shared spaces in the home have good air flow, such as by an air conditioner or an opened window, weather permitting.  • Perform hand hygiene frequently. Wash your hands often with soap and water for at least 20 seconds or use an alcohol-based hand  that contains 60 to 95% alcohol, covering all surfaces of your hands and rubbing them together until they feel dry. Soap and water should be used preferentially if hands are visibly dirty.  • Avoid touching your eyes, nose, and mouth with unwashed hands.  • The patient should wear a facemask when you are around other people. If the patient is not able to wear a facemask (for example, because it causes trouble breathing), you, as the caregiver, should wear a mask when you are in the same room as the patient.  • Wear a disposable facemask and gloves when you touch or have contact with the patient’s blood, stool, or body fluids, such as saliva, sputum, nasal mucus, vomit, or urine.   o Throw out disposable facemasks and gloves after using them. Do not reuse.  o When removing personal protective equipment, first remove and dispose of gloves. Then, immediately clean your hands with soap and water or alcohol-based hand . Next, remove and dispose of facemask, and immediately clean your hands again with soap and water or alcohol-based hand .  • Avoid sharing household items with the patient. You should not share dishes, drinking glasses, cups, eating  utensils, towels, bedding, or other items. After the patient uses these items, you should wash them thoroughly (see below “Wash laundry thoroughly”).  • Clean all “high-touch” surfaces, such as counters, tabletops, doorknobs, bathroom fixtures, toilets, phones, keyboards, tablets, and bedside tables, every day. Also, clean any surfaces that may have blood, stool, or body fluids on them.   o Use a household cleaning spray or wipe, according to the label instructions. Labels contain instructions for safe and effective use of the cleaning product including precautions you should take when applying the product, such as wearing gloves and making sure you have good ventilation during use of the product.  • Wash laundry thoroughly.   o Immediately remove and wash clothes or bedding that have blood, stool, or body fluids on them.  o Wear disposable gloves while handling soiled items and keep soiled items away from your body. Clean your hands (with soap and water or an alcohol-based hand ) immediately after removing your gloves.  o Read and follow directions on labels of laundry or clothing items and detergent. In general, using a normal laundry detergent according to washing machine instructions and dry thoroughly using the warmest temperatures recommended on the clothing label.  • Place all used disposable gloves, facemasks, and other contaminated items in a lined container before disposing of them with other household waste. Clean your hands (with soap and water or an alcohol-based hand ) immediately after handling these items. Soap and water should be used preferentially if hands are visibly dirty.  • Discuss any additional questions with your state or local health department or healthcare provider.    Adapted from information provided by the Centers for Disease Control and Prevention.  For more information, visit https://www.cdc.gov/coronavirus/2019-ncov/hcp/guidance-prevent-spread.html

## 2021-04-08 NOTE — PATIENT INSTRUCTIONS

## 2021-04-09 ENCOUNTER — TELEPHONE (OUTPATIENT)
Dept: FAMILY MEDICINE CLINIC | Facility: CLINIC | Age: 51
End: 2021-04-09

## 2021-04-09 LAB
ALBUMIN SERPL-MCNC: 4.1 G/DL (ref 3.5–5.2)
ALBUMIN/GLOB SERPL: 1.4 G/DL
ALP SERPL-CCNC: 107 U/L (ref 39–117)
ALT SERPL W P-5'-P-CCNC: 7 U/L (ref 1–33)
ANION GAP SERPL CALCULATED.3IONS-SCNC: 10.7 MMOL/L (ref 5–15)
AST SERPL-CCNC: 13 U/L (ref 1–32)
BASOPHILS # BLD AUTO: 0.04 10*3/MM3 (ref 0–0.2)
BASOPHILS NFR BLD AUTO: 0.7 % (ref 0–1.5)
BILIRUB SERPL-MCNC: 0.4 MG/DL (ref 0–1.2)
BUN SERPL-MCNC: 7 MG/DL (ref 6–20)
BUN/CREAT SERPL: 9.9 (ref 7–25)
CALCIUM SPEC-SCNC: 9.1 MG/DL (ref 8.6–10.5)
CHLORIDE SERPL-SCNC: 103 MMOL/L (ref 98–107)
CO2 SERPL-SCNC: 26.3 MMOL/L (ref 22–29)
CREAT SERPL-MCNC: 0.71 MG/DL (ref 0.57–1)
CRP SERPL-MCNC: 0.51 MG/DL (ref 0–0.5)
DEPRECATED RDW RBC AUTO: 41.3 FL (ref 37–54)
EOSINOPHIL # BLD AUTO: 0.21 10*3/MM3 (ref 0–0.4)
EOSINOPHIL NFR BLD AUTO: 3.4 % (ref 0.3–6.2)
ERYTHROCYTE [DISTWIDTH] IN BLOOD BY AUTOMATED COUNT: 13.4 % (ref 12.3–15.4)
ERYTHROCYTE [SEDIMENTATION RATE] IN BLOOD: 25 MM/HR (ref 0–30)
GFR SERPL CREATININE-BSD FRML MDRD: 87 ML/MIN/1.73
GLOBULIN UR ELPH-MCNC: 2.9 GM/DL
GLUCOSE SERPL-MCNC: 85 MG/DL (ref 65–99)
HCT VFR BLD AUTO: 44.7 % (ref 34–46.6)
HGB BLD-MCNC: 14.7 G/DL (ref 12–15.9)
IMM GRANULOCYTES # BLD AUTO: 0.01 10*3/MM3 (ref 0–0.05)
IMM GRANULOCYTES NFR BLD AUTO: 0.2 % (ref 0–0.5)
LABCORP SARS-COV-2, NAA 2 DAY TAT: NORMAL
LYMPHOCYTES # BLD AUTO: 1.95 10*3/MM3 (ref 0.7–3.1)
LYMPHOCYTES NFR BLD AUTO: 31.8 % (ref 19.6–45.3)
MCH RBC QN AUTO: 28 PG (ref 26.6–33)
MCHC RBC AUTO-ENTMCNC: 32.9 G/DL (ref 31.5–35.7)
MCV RBC AUTO: 85.1 FL (ref 79–97)
MONOCYTES # BLD AUTO: 0.43 10*3/MM3 (ref 0.1–0.9)
MONOCYTES NFR BLD AUTO: 7 % (ref 5–12)
NEUTROPHILS NFR BLD AUTO: 3.5 10*3/MM3 (ref 1.7–7)
NEUTROPHILS NFR BLD AUTO: 56.9 % (ref 42.7–76)
NRBC BLD AUTO-RTO: 0 /100 WBC (ref 0–0.2)
PLATELET # BLD AUTO: 336 10*3/MM3 (ref 140–450)
PMV BLD AUTO: 11.6 FL (ref 6–12)
POTASSIUM SERPL-SCNC: 4.2 MMOL/L (ref 3.5–5.2)
PROT SERPL-MCNC: 7 G/DL (ref 6–8.5)
RBC # BLD AUTO: 5.25 10*6/MM3 (ref 3.77–5.28)
SARS-COV-2 RNA RESP QL NAA+PROBE: NOT DETECTED
SODIUM SERPL-SCNC: 140 MMOL/L (ref 136–145)
WBC # BLD AUTO: 6.14 10*3/MM3 (ref 3.4–10.8)

## 2021-04-09 NOTE — PROGRESS NOTES
Covid test was negative.  If you have been exposed to Covid or have symptoms of Covid, continue to quarantine for 10 days or until free of fever without meds for 3 days. Consider getting second test toward end of that time before resuming usual activities-call if concern.  All labs were normal except one inflammatory was one point above normal.  Call if symptoms persit

## 2021-04-09 NOTE — TELEPHONE ENCOUNTER
HUB TO READ  Covid test was negative.  If you have been exposed to Covid or have symptoms of Covid, continue to quarantine for 10 days or until free of fever without meds for 3 days. Consider getting second test toward end of that time before resuming usual activities-call if concern.  All labs were normal except one inflammatory was one point above normal.  Call if symptoms persit

## 2021-08-12 ENCOUNTER — TELEPHONE (OUTPATIENT)
Dept: FAMILY MEDICINE CLINIC | Facility: CLINIC | Age: 51
End: 2021-08-12

## 2021-08-12 ENCOUNTER — OFFICE VISIT (OUTPATIENT)
Dept: FAMILY MEDICINE CLINIC | Facility: CLINIC | Age: 51
End: 2021-08-12

## 2021-08-12 VITALS
SYSTOLIC BLOOD PRESSURE: 147 MMHG | TEMPERATURE: 97.7 F | HEIGHT: 66 IN | WEIGHT: 202.2 LBS | OXYGEN SATURATION: 96 % | BODY MASS INDEX: 32.5 KG/M2 | HEART RATE: 66 BPM | DIASTOLIC BLOOD PRESSURE: 80 MMHG

## 2021-08-12 DIAGNOSIS — R06.02 SHORTNESS OF BREATH: Primary | ICD-10-CM

## 2021-08-12 DIAGNOSIS — Z86.16 HISTORY OF COVID-19: ICD-10-CM

## 2021-08-12 PROCEDURE — 86769 SARS-COV-2 COVID-19 ANTIBODY: CPT | Performed by: NURSE PRACTITIONER

## 2021-08-12 PROCEDURE — U0004 COV-19 TEST NON-CDC HGH THRU: HCPCS | Performed by: NURSE PRACTITIONER

## 2021-08-12 PROCEDURE — 99213 OFFICE O/P EST LOW 20 MIN: CPT | Performed by: NURSE PRACTITIONER

## 2021-08-12 RX ORDER — DOXYCYCLINE 100 MG/1
100 CAPSULE ORAL 2 TIMES DAILY
Qty: 20 CAPSULE | Refills: 0 | Status: SHIPPED | OUTPATIENT
Start: 2021-08-12

## 2021-08-12 RX ORDER — ALBUTEROL SULFATE 90 UG/1
2 AEROSOL, METERED RESPIRATORY (INHALATION) EVERY 4 HOURS PRN
Qty: 8 G | Refills: 1 | Status: SHIPPED | OUTPATIENT
Start: 2021-08-12

## 2021-08-12 NOTE — PROGRESS NOTES
"Subjective   Melia García is a 51 y.o. female presents for   Chief Complaint   Patient presents with   • Shortness of Breath     eyes hurt       Health Maintenance Due   Topic Date Due   • MAMMOGRAM  Never done   • COLORECTAL CANCER SCREENING  Never done   • Pneumococcal Vaccine 0-64 (1 of 2 - PPSV23) Never done   • COVID-19 Vaccine (1) Never done   • TDAP/TD VACCINES (1 - Tdap) Never done   • ZOSTER VACCINE (1 of 2) Never done   • LUNG CANCER SCREENING  Never done   • ANNUAL PHYSICAL  06/26/2021       History of Present Illness   Pt present with complaints of shortness of breath, fatigue, and eye pressure, and loose stools x 4 days.  She states her son was sick last week, but is feeling better now, and was not tested for covid.  She denies fever.   She has not taken otc meds for symptoms.   She states she was sick with covid last year and requests antibodies to be checked.     Vitals:    08/12/21 1442   BP: 147/80   BP Location: Right arm   Patient Position: Sitting   Cuff Size: Adult   Pulse: 66   Temp: 97.7 °F (36.5 °C)   SpO2: 96%   Weight: 91.7 kg (202 lb 3.2 oz)   Height: 167.6 cm (65.98\")     Body mass index is 32.65 kg/m².    Current Outpatient Medications on File Prior to Visit   Medication Sig Dispense Refill   • EPINEPHrine (EpiPen 2-Filiberto) 0.3 MG/0.3ML solution auto-injector injection Inject 0.3 mL under the skin into the appropriate area as directed As Needed (use for reaction). 1 each 1   • mupirocin (BACTROBAN) 2 % ointment Apply  topically to the appropriate area as directed 3 (Three) Times a Day. 22 g 0     No current facility-administered medications on file prior to visit.       The following portions of the patient's history were reviewed and updated as appropriate: allergies, current medications, past family history, past medical history, past social history, past surgical history, and problem list.    Review of Systems   Constitutional: Positive for fatigue. Negative for chills and fever. "   HENT: Negative for sinus pressure and sore throat.    Eyes: Negative for blurred vision.   Respiratory: Positive for cough and shortness of breath.    Cardiovascular: Negative for chest pain.   Gastrointestinal: Negative for abdominal pain.   Endocrine: Negative.    Genitourinary: Negative.    Musculoskeletal: Negative for arthralgias and joint swelling.   Skin: Negative for color change.   Allergic/Immunologic: Negative.    Neurological: Negative for dizziness.   Hematological: Negative.    Psychiatric/Behavioral: Negative for behavioral problems.       Objective   Physical Exam  Vitals and nursing note reviewed.   Constitutional:       Appearance: Normal appearance. She is well-developed. She is obese.   HENT:      Head: Normocephalic and atraumatic.      Right Ear: External ear normal.      Left Ear: External ear normal.      Nose: Nose normal.   Eyes:      Extraocular Movements: Extraocular movements intact.      Conjunctiva/sclera: Conjunctivae normal.      Pupils: Pupils are equal, round, and reactive to light.   Cardiovascular:      Rate and Rhythm: Normal rate and regular rhythm.      Heart sounds: Normal heart sounds.   Pulmonary:      Effort: Pulmonary effort is normal.      Breath sounds: Wheezing present.   Abdominal:      General: Bowel sounds are normal.      Palpations: Abdomen is soft.   Genitourinary:     Vagina: Normal.   Musculoskeletal:         General: Normal range of motion.      Cervical back: Normal range of motion and neck supple.   Skin:     General: Skin is warm and dry.   Neurological:      General: No focal deficit present.      Mental Status: She is alert and oriented to person, place, and time.   Psychiatric:         Mood and Affect: Mood normal.         Behavior: Behavior normal.         Thought Content: Thought content normal.         Judgment: Judgment normal.       PHQ-9 Total Score: 0    Assessment/Plan   Diagnoses and all orders for this visit:    1. Shortness of breath  (Primary)  -     COVID-19,[facility], NP/OP SWAB IN UTM/VTM/[place] TRANSPORT MEDIA,24 HR TAT - Swab, Nasopharynx; Future  -     albuterol sulfate  (90 Base) MCG/ACT inhaler; Inhale 2 puffs Every 4 (Four) Hours As Needed for Wheezing.  Dispense: 8 g; Refill: 1  -     doxycycline (MONODOX) 100 MG capsule; Take 1 capsule by mouth 2 (Two) Times a Day.  Dispense: 20 capsule; Refill: 0  -     COVID-19,[facility]),[facility], NP/OP SWAB IN UTM/VTM/[place] TRANSPORT MEDIA,24 HR TAT - Swab, Nasopharynx    2. History of COVID-19  -     SARS-CoV-2 Antibodies (Roche)        Patient Instructions   Shortness of Breath, Adult  Shortness of breath means you have trouble breathing. Shortness of breath could be a sign of a medical problem.  Follow these instructions at home:    · Watch for any changes in your symptoms.  · Do not use any products that contain nicotine or tobacco, such as cigarettes, e-cigarettes, and chewing tobacco.  · Do not smoke. Smoking can cause shortness of breath. If you need help to quit smoking, ask your doctor.  · Avoid things that can make it harder to breathe, such as:  ? Mold.  ? Dust.  ? Air pollution.  ? Chemical smells.  ? Things that can cause allergy symptoms (allergens), if you have allergies.  · Keep your living space clean. Use products that help remove mold and dust.  · Rest as needed. Slowly return to your normal activities.  · Take over-the-counter and prescription medicines only as told by your doctor. This includes oxygen therapy and inhaled medicines.  · Keep all follow-up visits as told by your doctor. This is important.  Contact a doctor if:  · Your condition does not get better as soon as expected.  · You have a hard time doing your normal activities, even after you rest.  · You have new symptoms.  Get help right away if:  · Your shortness of breath gets worse.  · You have trouble breathing when you are resting.  · You feel light-headed or you pass out  (faint).  · You have a cough that is not helped by medicines.  · You cough up blood.  · You have pain with breathing.  · You have pain in your chest, arms, shoulders, or belly (abdomen).  · You have a fever.  · You cannot walk up stairs.  · You cannot exercise the way you normally do.  These symptoms may represent a serious problem that is an emergency. Do not wait to see if the symptoms will go away. Get medical help right away. Call your local emergency services (911 in the [place] Do not drive yourself to the hospital.  Summary  · Shortness of breath is when you have trouble breathing enough air. It can be a sign of a medical problem.  · Avoid things that make it hard for you to breathe, such as smoking, pollution, mold, and dust.  · Watch for any changes in your symptoms. Contact your doctor if you do not get better or you get worse.  This information is not intended to replace advice given to you by your health care provider. Make sure you discuss any questions you have with your health care provider.  Document Revised: 05/20/2019 Document Reviewed: 05/20/2019  Elsevier Patient Education © 2021 [employer]

## 2021-08-12 NOTE — TELEPHONE ENCOUNTER
Caller: Melia García    Relationship: Self    Best call back number: [phone]    What was the call regarding: THE PATIENT STATES THAT SHE GOT HOME AND DECIDED THAT SHE WANTED THE MEDICATION THAT DR. BRICEÑO PRESCRIBED. THE MEDICATIONS ARE albuterol sulfate  (90 Base) MCG/ACT inhaler AND doxycycline (MONODOX) 100 MG capsule. THE PATIENT USES [facility] #[id number] - [place], [place] - [address] . - [phone]  - [phone]   [phone]

## 2021-08-12 NOTE — PATIENT INSTRUCTIONS
Shortness of Breath, Adult  Shortness of breath means you have trouble breathing. Shortness of breath could be a sign of a medical problem.  Follow these instructions at home:    · Watch for any changes in your symptoms.  · Do not use any products that contain nicotine or tobacco, such as cigarettes, e-cigarettes, and chewing tobacco.  · Do not smoke. Smoking can cause shortness of breath. If you need help to quit smoking, ask your doctor.  · Avoid things that can make it harder to breathe, such as:  ? Mold.  ? Dust.  ? Air pollution.  ? Chemical smells.  ? Things that can cause allergy symptoms (allergens), if you have allergies.  · Keep your living space clean. Use products that help remove mold and dust.  · Rest as needed. Slowly return to your normal activities.  · Take over-the-counter and prescription medicines only as told by your doctor. This includes oxygen therapy and inhaled medicines.  · Keep all follow-up visits as told by your doctor. This is important.  Contact a doctor if:  · Your condition does not get better as soon as expected.  · You have a hard time doing your normal activities, even after you rest.  · You have new symptoms.  Get help right away if:  · Your shortness of breath gets worse.  · You have trouble breathing when you are resting.  · You feel light-headed or you pass out (faint).  · You have a cough that is not helped by medicines.  · You cough up blood.  · You have pain with breathing.  · You have pain in your chest, arms, shoulders, or belly (abdomen).  · You have a fever.  · You cannot walk up stairs.  · You cannot exercise the way you normally do.  These symptoms may represent a serious problem that is an emergency. Do not wait to see if the symptoms will go away. Get medical help right away. Call your local emergency services (911 in the [place] Do not drive yourself to the hospital.  Summary  · Shortness of breath is when you have trouble breathing enough air. It can be a sign of a  medical problem.  · Avoid things that make it hard for you to breathe, such as smoking, pollution, mold, and dust.  · Watch for any changes in your symptoms. Contact your doctor if you do not get better or you get worse.  This information is not intended to replace advice given to you by your health care provider. Make sure you discuss any questions you have with your health care provider.  Document Revised: 05/20/2019 Document Reviewed: 05/20/2019  Elsevier Patient Education © 2021 [employer]

## 2021-08-13 ENCOUNTER — TELEPHONE (OUTPATIENT)
Dept: FAMILY MEDICINE CLINIC | Facility: CLINIC | Age: 51
End: 2021-08-13

## 2021-08-13 LAB
SARS-COV-2 AB SERPL QL IA: NEGATIVE
SARS-COV-2 ORF1AB RESP QL NAA+PROBE: DETECTED

## 2021-08-13 NOTE — TELEPHONE ENCOUNTER
Patient verbalized understanding and was given an antibiotic yesterday and wondering if she should still start those or hold off. She has a cough, some diarrhea, and a little SOB, but not enough for the IV yet according to her.

## 2021-08-13 NOTE — TELEPHONE ENCOUNTER
----- Message from JS Fitzgeradl sent at 8/13/2021  8:29 AM EDT -----  Please notify pt her covid test is positive. Continue to quarantine 10 days from sx onset. She meets the criteria for monoclonal antibody infusion if she is interested.

## 2021-08-13 NOTE — TELEPHONE ENCOUNTER
----- Message from Silvia Henderson MD sent at 8/13/2021  8:12 AM EDT -----  Regarding: COVID-19 Lab Results (Informational Only - No Action Required)  Please call patient and advise is Covid positive and make sure she is quarantining and let us know how she is doing.  If short of air may want to consider monoclonal antibody treatment by IV done at hospital .  Keep us posted and we can discuss if she is feeling really sick.

## 2021-08-13 NOTE — PROGRESS NOTES
Please call patient and advise is Covid positive and make sure she is quarantining and let us know how she is doing.  If short of air may want to consider monoclonal antibody treatment by IV done at hospital .  Keep us posted and we can discuss if she is feeling really sick.

## 2021-08-17 ENCOUNTER — TELEPHONE (OUTPATIENT)
Dept: FAMILY MEDICINE CLINIC | Facility: CLINIC | Age: 51
End: 2021-08-17

## 2021-08-17 NOTE — TELEPHONE ENCOUNTER
Caller: Melia García    Relationship to patient: Self    Best call back number: [phone]    Date of positive COVID19 test: 8/12/2021    Date of initial quarantine: 8/13/2021    Additional information or concerns: PATIENT WANTS TO KNOW IF YOU CAN TELL THE STRAIN OF THE COVID VIRUS FROM HER LAB RESULTS. PLEASE CALL PATIENT

## 2022-01-14 ENCOUNTER — TELEPHONE (OUTPATIENT)
Dept: FAMILY MEDICINE CLINIC | Facility: CLINIC | Age: 52
End: 2022-01-14

## 2022-01-14 DIAGNOSIS — Z12.11 SCREENING FOR COLON CANCER: Primary | ICD-10-CM

## 2022-01-19 ENCOUNTER — PRE-PROCEDURE SCREENING (OUTPATIENT)
Dept: GASTROENTEROLOGY | Facility: CLINIC | Age: 52
End: 2022-01-19

## 2023-07-20 PROBLEM — E66.812 CLASS 2 OBESITY DUE TO EXCESS CALORIES WITHOUT SERIOUS COMORBIDITY WITH BODY MASS INDEX (BMI) OF 36.0 TO 36.9 IN ADULT: Status: ACTIVE | Noted: 2021-03-11

## 2023-07-20 PROBLEM — Z12.11 ENCOUNTER FOR SCREENING FOR MALIGNANT NEOPLASM OF COLON: Status: ACTIVE | Noted: 2023-07-20

## 2023-07-20 PROBLEM — Z12.4 SCREENING FOR CERVICAL CANCER: Status: ACTIVE | Noted: 2023-07-20

## 2023-07-20 PROBLEM — Z12.31 ENCOUNTER FOR SCREENING MAMMOGRAM FOR MALIGNANT NEOPLASM OF BREAST: Status: ACTIVE | Noted: 2023-07-20

## 2023-07-20 PROBLEM — I10 HYPERTENSION: Status: ACTIVE | Noted: 2023-07-20

## 2023-07-20 PROBLEM — Z72.0 TOBACCO USE: Status: ACTIVE | Noted: 2023-07-20

## 2023-07-20 PROBLEM — Z00.01 ENCOUNTER FOR ANNUAL GENERAL MEDICAL EXAMINATION WITH ABNORMAL FINDINGS IN ADULT: Status: ACTIVE | Noted: 2023-07-20

## 2023-07-20 PROBLEM — R06.2 WHEEZING: Status: ACTIVE | Noted: 2023-07-20

## 2023-08-02 ENCOUNTER — TELEPHONE (OUTPATIENT)
Dept: FAMILY MEDICINE CLINIC | Facility: CLINIC | Age: 53
End: 2023-08-02

## 2023-08-09 ENCOUNTER — CLINICAL SUPPORT (OUTPATIENT)
Dept: FAMILY MEDICINE CLINIC | Facility: CLINIC | Age: 53
End: 2023-08-09
Payer: MEDICAID

## 2023-08-09 DIAGNOSIS — E78.5 HYPERLIPIDEMIA, UNSPECIFIED HYPERLIPIDEMIA TYPE: Primary | ICD-10-CM

## 2023-08-09 DIAGNOSIS — I10 HYPERTENSION, UNSPECIFIED TYPE: ICD-10-CM

## 2023-08-09 DIAGNOSIS — Z00.01 ENCOUNTER FOR ANNUAL GENERAL MEDICAL EXAMINATION WITH ABNORMAL FINDINGS IN ADULT: ICD-10-CM

## 2023-08-09 DIAGNOSIS — E55.9 VITAMIN D DEFICIENCY: ICD-10-CM

## 2023-08-09 LAB
25(OH)D3 SERPL-MCNC: 21.4 NG/ML (ref 30–100)
BASOPHILS # BLD AUTO: 0.04 10*3/MM3 (ref 0–0.2)
BASOPHILS NFR BLD AUTO: 0.7 % (ref 0–1.5)
CHOLEST SERPL-MCNC: 240 MG/DL (ref 0–200)
DEPRECATED RDW RBC AUTO: 38.6 FL (ref 37–54)
EOSINOPHIL # BLD AUTO: 0.32 10*3/MM3 (ref 0–0.4)
EOSINOPHIL NFR BLD AUTO: 5.6 % (ref 0.3–6.2)
ERYTHROCYTE [DISTWIDTH] IN BLOOD BY AUTOMATED COUNT: 13.4 % (ref 12.3–15.4)
HCT VFR BLD AUTO: 43.9 % (ref 34–46.6)
HDLC SERPL-MCNC: 50 MG/DL (ref 40–60)
HGB BLD-MCNC: 14.4 G/DL (ref 12–15.9)
IMM GRANULOCYTES # BLD AUTO: 0.01 10*3/MM3 (ref 0–0.05)
IMM GRANULOCYTES NFR BLD AUTO: 0.2 % (ref 0–0.5)
LDLC SERPL CALC-MCNC: 162 MG/DL (ref 0–100)
LDLC/HDLC SERPL: 3.19 {RATIO}
LYMPHOCYTES # BLD AUTO: 1.58 10*3/MM3 (ref 0.7–3.1)
LYMPHOCYTES NFR BLD AUTO: 27.6 % (ref 19.6–45.3)
MAGNESIUM SERPL-MCNC: 2.4 MG/DL (ref 1.6–2.6)
MCH RBC QN AUTO: 26.7 PG (ref 26.6–33)
MCHC RBC AUTO-ENTMCNC: 32.8 G/DL (ref 31.5–35.7)
MCV RBC AUTO: 81.3 FL (ref 79–97)
MONOCYTES # BLD AUTO: 0.42 10*3/MM3 (ref 0.1–0.9)
MONOCYTES NFR BLD AUTO: 7.3 % (ref 5–12)
NEUTROPHILS NFR BLD AUTO: 3.36 10*3/MM3 (ref 1.7–7)
NEUTROPHILS NFR BLD AUTO: 58.6 % (ref 42.7–76)
NRBC BLD AUTO-RTO: 0 /100 WBC (ref 0–0.2)
PLATELET # BLD AUTO: 297 10*3/MM3 (ref 140–450)
PMV BLD AUTO: 11 FL (ref 6–12)
RBC # BLD AUTO: 5.4 10*6/MM3 (ref 3.77–5.28)
TRIGL SERPL-MCNC: 153 MG/DL (ref 0–150)
TSH SERPL DL<=0.05 MIU/L-ACNC: 1.04 UIU/ML (ref 0.27–4.2)
VLDLC SERPL-MCNC: 28 MG/DL (ref 5–40)
WBC NRBC COR # BLD: 5.73 10*3/MM3 (ref 3.4–10.8)

## 2023-08-09 PROCEDURE — 82306 VITAMIN D 25 HYDROXY: CPT | Performed by: PREVENTIVE MEDICINE

## 2023-08-09 PROCEDURE — 80061 LIPID PANEL: CPT | Performed by: PREVENTIVE MEDICINE

## 2023-08-09 PROCEDURE — 83735 ASSAY OF MAGNESIUM: CPT | Performed by: PREVENTIVE MEDICINE

## 2023-08-09 PROCEDURE — 84443 ASSAY THYROID STIM HORMONE: CPT | Performed by: PREVENTIVE MEDICINE

## 2023-08-09 PROCEDURE — 85025 COMPLETE CBC W/AUTO DIFF WBC: CPT | Performed by: PREVENTIVE MEDICINE

## 2023-08-09 NOTE — PROGRESS NOTES
Venipuncture performed on Left Arm by Franchesca Ayala MA  with good hemostasis. Patient tolerated well. 08/09/23  Silvia Henderson MD

## 2023-08-10 ENCOUNTER — TELEPHONE (OUTPATIENT)
Dept: FAMILY MEDICINE CLINIC | Facility: CLINIC | Age: 53
End: 2023-08-10

## 2023-08-10 NOTE — PROGRESS NOTES
: Bad cholesterol are still elevated.  If she has done all she can with diet and exercise she should consider a statin please let us know.  Vitamin D is low so take of 1000 units daily over-the-counter.  Call if any other concerns

## 2023-08-10 NOTE — TELEPHONE ENCOUNTER
HUB TO READ:  ----- Message from Silvia Henderson MD sent at 8/10/2023  7:52 AM EDT -----  : Bad cholesterol are still elevated.  If she has done all she can with diet and exercise she should consider a statin please let us know.  Vitamin D is low so take of 1000 units daily over-the-counter.  Call if any other concerns

## 2023-08-10 NOTE — TELEPHONE ENCOUNTER
Melia García notified and voiced comprehension and understanding.    Will work on diet and exercise first.

## 2023-09-06 ENCOUNTER — TELEPHONE (OUTPATIENT)
Dept: FAMILY MEDICINE CLINIC | Facility: CLINIC | Age: 53
End: 2023-09-06

## 2023-09-06 NOTE — TELEPHONE ENCOUNTER
Caller: Melia García    Relationship: Self    Best call back number:   Melia García (Self) [phone] ([employer]     What was the call regarding: WANTING TO KNOW THE NAME OF THE ELIZABETH.N.T.  THAT SHE HAD SEEN IN THE PAST    CAN YOU CALL AND ADVISE       Is it okay if the provider responds through MyChart:

## 2023-09-08 ENCOUNTER — TELEPHONE (OUTPATIENT)
Dept: FAMILY MEDICINE CLINIC | Facility: CLINIC | Age: 53
End: 2023-09-08

## 2024-07-19 ENCOUNTER — OFFICE VISIT (OUTPATIENT)
Dept: FAMILY MEDICINE CLINIC | Facility: CLINIC | Age: 54
End: 2024-07-19
Payer: MEDICAID

## 2024-07-19 DIAGNOSIS — R05.1 ACUTE COUGH: ICD-10-CM

## 2024-07-19 DIAGNOSIS — R50.9 FEVER, UNSPECIFIED FEVER CAUSE: ICD-10-CM
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
## 2024-07-19 DIAGNOSIS — R31.9 HEMATURIA, UNSPECIFIED TYPE: Primary | ICD-10-CM

## 2024-07-19 DIAGNOSIS — R51.9 ACUTE NONINTRACTABLE HEADACHE, UNSPECIFIED HEADACHE TYPE: ICD-10-CM

## 2024-07-19 PROCEDURE — 1125F AMNT PAIN NOTED PAIN PRSNT: CPT | Performed by: PREVENTIVE MEDICINE

## 2024-07-19 PROCEDURE — 99214 OFFICE O/P EST MOD 30 MIN: CPT | Performed by: PREVENTIVE MEDICINE

## 2024-07-19 PROCEDURE — 1159F MED LIST DOCD IN RCRD: CPT | Performed by: PREVENTIVE MEDICINE

## 2024-07-19 PROCEDURE — 3074F SYST BP LT 130 MM HG: CPT | Performed by: PREVENTIVE MEDICINE

## 2024-07-19 PROCEDURE — 3078F DIAST BP <80 MM HG: CPT | Performed by: PREVENTIVE MEDICINE

## 2024-07-19 PROCEDURE — 87428 SARSCOV & INF VIR A&B AG IA: CPT | Performed by: PREVENTIVE MEDICINE

## 2024-07-19 PROCEDURE — 1160F RVW MEDS BY RX/DR IN RCRD: CPT | Performed by: PREVENTIVE MEDICINE

## 2024-07-21 RX ORDER — DOXYCYCLINE HYCLATE 100 MG/1
100 CAPSULE ORAL 2 TIMES DAILY
Start: 2024-07-21

## 2024-07-21 NOTE — PATIENT INSTRUCTIONS
Health Maintenance Due   Topic Date Due    MAMMOGRAM  Never done    COLORECTAL CANCER SCREENING  Never done    TDAP/TD VACCINES (1 - Tdap) Never done    ZOSTER VACCINE (1 of 2) Never done    PAP SMEAR  01/30/2023    COVID-19 Vaccine (1 - 2023-24 season) Never done    ANNUAL PHYSICAL  07/20/2024    LIPID PANEL  08/09/2024

## 2024-07-21 NOTE — PROGRESS NOTES
"Subjective   Melia García is a 54 y.o. female presents for No chief complaint on file.  It should be noted that the computers were down the day we saw her and she is being evaluated on downtime she needs so annual exam and other health maintenance problems were not discussed.    Health Maintenance Due   Topic Date Due    MAMMOGRAM  Never done    COLORECTAL CANCER SCREENING  Never done    TDAP/TD VACCINES (1 - Tdap) Never done    ZOSTER VACCINE (1 of 2) Never done    LUNG CANCER SCREENING  Never done    PAP SMEAR  01/30/2023    COVID-19 Vaccine (1 - 2023-24 season) Never done    ANNUAL PHYSICAL  07/20/2024    BMI FOLLOWUP  07/20/2024    LIPID PANEL  08/09/2024       History of Present Illness   History of Present Illness    54-year-old female with a walk-in today complaining of blood in her urine but no dysuria fever up to 100 acute non-intractable headache acute cough    Yellow stools and tick bite  6 multiple sites.    She saved doxycycline from her last upper respiratory infection and has taken it she has not been around anybody that has had any COVID no recent vaccinations    She does remember scratching her clitoris and she has noted blood coming from that area.  She feels as though that is where the blood in her urine is coming from and that she has not had any other urinary tract symptoms including back pain stools were of a pale yellow color and there was no blood in that.  Those seem to have been getting better as have all of her symptoms.  Vitals:    07/19/24 0957 07/19/24 0958   BP: 128/86 128/76   BP Location: Left arm Right arm   Patient Position: Sitting Sitting   Cuff Size: Adult Adult   Pulse: 50 50   Temp: 96.4 °F (35.8 °C)    TempSrc: Temporal    SpO2: 95%    Weight: 104 kg (228 lb 3.2 oz)    Height: 167.6 cm (65.98\")      Body mass index is 36.85 kg/m².    Current Outpatient Medications on File Prior to Visit   Medication Sig Dispense Refill    albuterol sulfate  (90 Base) MCG/ACT " inhaler Inhale 2 puffs Every 4 (Four) Hours As Needed for Wheezing. 1 g 1    EPINEPHrine (EpiPen 2-Filiberto) 0.3 MG/0.3ML solution auto-injector injection Inject 0.3 mL under the skin into the appropriate area as directed As Needed (use for reaction). 1 each 1     No current facility-administered medications on file prior to visit.       The following portions of the patient's history were reviewed and updated as appropriate: allergies, current medications, past family history, past medical history, past social history, past surgical history, and problem list.    Review of Systems   Constitutional:  Positive for fatigue and fever.   HENT:  Positive for congestion.    Respiratory:  Positive for cough.    Gastrointestinal:  Positive for diarrhea.   Genitourinary:  Positive for hematuria.   Musculoskeletal:  Positive for arthralgias and myalgias.   Skin:  Negative for rash.   Neurological:  Positive for headache.   Hematological:  Negative for adenopathy.       Objective   Physical Exam  Vitals reviewed.   Constitutional:       General: She is not in acute distress.     Appearance: She is well-developed. She is obese. She is not ill-appearing or toxic-appearing.   HENT:      Head: Normocephalic and atraumatic.      Right Ear: Tympanic membrane, ear canal and external ear normal.      Left Ear: Tympanic membrane, ear canal and external ear normal.      Nose: Nose normal.      Mouth/Throat:      Mouth: Mucous membranes are moist.      Pharynx: No posterior oropharyngeal erythema.   Eyes:      Extraocular Movements: Extraocular movements intact.      Conjunctiva/sclera: Conjunctivae normal.      Pupils: Pupils are equal, round, and reactive to light.   Cardiovascular:      Rate and Rhythm: Regular rhythm. Bradycardia present.      Heart sounds: Normal heart sounds.   Pulmonary:      Effort: Pulmonary effort is normal.      Breath sounds: Normal breath sounds.   Abdominal:      General: Bowel sounds are normal. There is no  distension.      Palpations: Abdomen is soft. There is no mass.      Tenderness: There is no abdominal tenderness. There is no right CVA tenderness or left CVA tenderness.   Musculoskeletal:         General: Normal range of motion.      Cervical back: Neck supple.   Skin:     General: Skin is warm.      Coloration: Skin is not jaundiced.      Findings: No rash.      Comments: Multiple papular excoriations on legs arms and left axillary area where ticks have injured.   Neurological:      General: No focal deficit present.      Mental Status: She is alert and oriented to person, place, and time.   Psychiatric:         Mood and Affect: Mood normal.         Behavior: Behavior normal.       Physical Exam      PHQ-9 Total Score:    Results           Assessment & Plan   Diagnoses and all orders for this visit:    1. Hematuria, unspecified type (Primary)  -     POC Urinalysis Dipstick, Automated    2. Fever, unspecified fever cause    3. Acute nonintractable headache, unspecified headache type    4. Acute cough  -     POCT SARS-CoV-2 + Flu Antigen RACQUEL    5. Tick bite, unspecified site, initial encounter    Other orders  -     doxycycline (VIBRAMYCIN) 100 MG capsule; Take 1 capsule by mouth 2 (Two) Times a Day.      Assessment & Plan      Patient Instructions     Health Maintenance Due   Topic Date Due    MAMMOGRAM  Never done    COLORECTAL CANCER SCREENING  Never done    TDAP/TD VACCINES (1 - Tdap) Never done    ZOSTER VACCINE (1 of 2) Never done    PAP SMEAR  01/30/2023    COVID-19 Vaccine (1 - 2023-24 season) Never done    ANNUAL PHYSICAL  07/20/2024    LIPID PANEL  08/09/2024      Patient was advised that the symptoms may be caused by a tickborne disease and that she should take every doxycycline that she has at home and everyone that has been prescribed for her down to the last pill.  If she is not improving by next week we will consider ordering a tick panel.  Patient is also to be scheduled in for her annual wellness  exam.  She should also get her urine repeated once the bleeding from her injured clitoris has resolved.  And she will be called at home to schedule her annual wellness exam.     Patient or patient representative verbalized consent for the use of Ambient Listening during the visit with  Silvia Henderson MD for chart documentation. 7/24/2024  07:25 EDT

## 2024-07-23 VITALS
TEMPERATURE: 96.4 F | OXYGEN SATURATION: 95 % | BODY MASS INDEX: 36.67 KG/M2 | SYSTOLIC BLOOD PRESSURE: 128 MMHG | DIASTOLIC BLOOD PRESSURE: 76 MMHG | HEIGHT: 66 IN | HEART RATE: 50 BPM | WEIGHT: 228.2 LBS

## 2024-07-23 LAB
BILIRUB BLD-MCNC: NEGATIVE MG/DL
CLARITY, POC: CLEAR
COLOR UR: YELLOW
EXPIRATION DATE: ABNORMAL
GLUCOSE UR STRIP-MCNC: NEGATIVE MG/DL
KETONES UR QL: NEGATIVE
LEUKOCYTE EST, POC: ABNORMAL
Lab: ABNORMAL
NITRITE UR-MCNC: NEGATIVE MG/ML
PH UR: 5.5 [PH] (ref 5–8)
PROT UR STRIP-MCNC: NEGATIVE MG/DL
RBC # UR STRIP: ABNORMAL /UL
SP GR UR: 1.01 (ref 1–1.03)
UROBILINOGEN UR QL: ABNORMAL

## 2024-07-23 RX ORDER — DOXYCYCLINE 100 MG/1
100 CAPSULE ORAL 2 TIMES DAILY
Qty: 20 CAPSULE | Refills: 0 | Status: SHIPPED | OUTPATIENT
Start: 2024-07-23

## 2024-07-25 LAB
EXPIRATION DATE: NORMAL
FLUAV AG UPPER RESP QL IA.RAPID: NOT DETECTED
FLUBV AG UPPER RESP QL IA.RAPID: NOT DETECTED
INTERNAL CONTROL: NORMAL
Lab: NORMAL
SARS-COV-2 AG UPPER RESP QL IA.RAPID: NOT DETECTED

## 2024-08-01 ENCOUNTER — TELEPHONE (OUTPATIENT)
Dept: FAMILY MEDICINE CLINIC | Facility: CLINIC | Age: 54
End: 2024-08-01
Payer: MEDICAID

## 2024-08-01 DIAGNOSIS — Z12.31 ENCOUNTER FOR SCREENING MAMMOGRAM FOR MALIGNANT NEOPLASM OF BREAST: Primary | ICD-10-CM

## 2024-08-02 ENCOUNTER — TELEPHONE (OUTPATIENT)
Dept: FAMILY MEDICINE CLINIC | Facility: CLINIC | Age: 54
End: 2024-08-02
Payer: MEDICAID

## 2024-08-02 ENCOUNTER — CLINICAL SUPPORT (OUTPATIENT)
Dept: FAMILY MEDICINE CLINIC | Facility: CLINIC | Age: 54
End: 2024-08-02
Payer: MEDICAID

## 2024-08-02 DIAGNOSIS — R35.0 FREQUENCY OF URINATION: Primary | ICD-10-CM

## 2024-08-02 LAB
BILIRUB BLD-MCNC: NEGATIVE MG/DL
CLARITY, POC: CLEAR
COLOR UR: YELLOW
EXPIRATION DATE: NORMAL
GLUCOSE UR STRIP-MCNC: NEGATIVE MG/DL
KETONES UR QL: NEGATIVE
LEUKOCYTE EST, POC: NEGATIVE
Lab: NORMAL
NITRITE UR-MCNC: NEGATIVE MG/ML
PH UR: 7 [PH] (ref 5–8)
PROT UR STRIP-MCNC: NEGATIVE MG/DL
RBC # UR STRIP: NEGATIVE /UL
SP GR UR: 1.01 (ref 1–1.03)
UROBILINOGEN UR QL: NORMAL

## 2024-08-02 PROCEDURE — 81003 URINALYSIS AUTO W/O SCOPE: CPT | Performed by: PREVENTIVE MEDICINE

## 2024-08-02 NOTE — TELEPHONE ENCOUNTER
Let patient know that Dr Henderson has not yet responded to the urinalysis and that she would be called if Dr Henderson calls in medicine for her.

## 2024-08-02 NOTE — TELEPHONE ENCOUNTER
Caller: Melia García    Relationship: Self    Best call back number: [phone]     What test was performed: URINALYSIS    When was the test performed: TODAY 8/2    Where was the test performed: [facility]    PLEASE ADVISE

## 2024-08-05 ENCOUNTER — TELEPHONE (OUTPATIENT)
Dept: FAMILY MEDICINE CLINIC | Facility: CLINIC | Age: 54
End: 2024-08-05
Payer: MEDICAID

## 2024-08-22 ENCOUNTER — TELEPHONE (OUTPATIENT)
Dept: FAMILY MEDICINE CLINIC | Facility: CLINIC | Age: 54
End: 2024-08-22

## 2024-10-09 ENCOUNTER — TELEPHONE (OUTPATIENT)
Dept: FAMILY MEDICINE CLINIC | Facility: CLINIC | Age: 54
End: 2024-10-09
Payer: MEDICAID

## 2024-10-09 DIAGNOSIS — I10 HYPERTENSION, UNSPECIFIED TYPE: ICD-10-CM

## 2024-10-09 DIAGNOSIS — R20.2 HAND PARESTHESIA: ICD-10-CM

## 2024-10-09 DIAGNOSIS — E78.5 HYPERLIPIDEMIA, UNSPECIFIED HYPERLIPIDEMIA TYPE: ICD-10-CM

## 2024-10-09 DIAGNOSIS — R00.1 BRADYCARDIA: ICD-10-CM

## 2024-10-09 DIAGNOSIS — E55.9 VITAMIN D DEFICIENCY: Primary | ICD-10-CM

## 2024-10-09 NOTE — TELEPHONE ENCOUNTER
Caller: Melia García    Relationship: Self    Best call back number: [phone]    What orders are you requesting (i.e. lab or imaging): ANNUAL PHYSICAL LABS    In what timeframe would the patient need to come in: PRIOR TO 10.18.24    Where will you receive your lab/imaging services: IN OFFICE    Additional notes:   PLEASE CALL TO SCHEDULE.

## 2024-10-11 ENCOUNTER — LAB (OUTPATIENT)
Dept: FAMILY MEDICINE CLINIC | Facility: CLINIC | Age: 54
End: 2024-10-11
Payer: MEDICAID

## 2024-10-11 DIAGNOSIS — E78.5 HYPERLIPIDEMIA, UNSPECIFIED HYPERLIPIDEMIA TYPE: ICD-10-CM

## 2024-10-11 DIAGNOSIS — I10 HYPERTENSION, UNSPECIFIED TYPE: ICD-10-CM

## 2024-10-11 DIAGNOSIS — E55.9 VITAMIN D DEFICIENCY: ICD-10-CM

## 2024-10-11 DIAGNOSIS — R00.1 BRADYCARDIA: ICD-10-CM

## 2024-10-11 DIAGNOSIS — R20.2 HAND PARESTHESIA: ICD-10-CM

## 2024-10-11 LAB
25(OH)D3 SERPL-MCNC: 19.4 NG/ML (ref 30–100)
ALBUMIN SERPL-MCNC: 4 G/DL (ref 3.5–5.2)
ALBUMIN/GLOB SERPL: 1.3 G/DL
ALP SERPL-CCNC: 133 U/L (ref 39–117)
ALT SERPL W P-5'-P-CCNC: 13 U/L (ref 1–33)
ANION GAP SERPL CALCULATED.3IONS-SCNC: 10 MMOL/L (ref 5–15)
AST SERPL-CCNC: 18 U/L (ref 1–32)
BASOPHILS # BLD AUTO: 0.05 10*3/MM3 (ref 0–0.2)
BASOPHILS NFR BLD AUTO: 0.6 % (ref 0–1.5)
BILIRUB SERPL-MCNC: 0.3 MG/DL (ref 0–1.2)
BUN SERPL-MCNC: 7 MG/DL (ref 6–20)
BUN/CREAT SERPL: 8 (ref 7–25)
CALCIUM SPEC-SCNC: 9.1 MG/DL (ref 8.6–10.5)
CHLORIDE SERPL-SCNC: 102 MMOL/L (ref 98–107)
CHOLEST SERPL-MCNC: 240 MG/DL (ref 0–200)
CO2 SERPL-SCNC: 27 MMOL/L (ref 22–29)
CREAT SERPL-MCNC: 0.87 MG/DL (ref 0.57–1)
DEPRECATED RDW RBC AUTO: 42.3 FL (ref 37–54)
EGFRCR SERPLBLD CKD-EPI 2021: 78.8 ML/MIN/1.73
EOSINOPHIL # BLD AUTO: 0.31 10*3/MM3 (ref 0–0.4)
EOSINOPHIL NFR BLD AUTO: 3.6 % (ref 0.3–6.2)
ERYTHROCYTE [DISTWIDTH] IN BLOOD BY AUTOMATED COUNT: 14 % (ref 12.3–15.4)
GLOBULIN UR ELPH-MCNC: 3.2 GM/DL
GLUCOSE SERPL-MCNC: 88 MG/DL (ref 65–99)
HCT VFR BLD AUTO: 45.7 % (ref 34–46.6)
HDLC SERPL-MCNC: 45 MG/DL (ref 40–60)
HGB BLD-MCNC: 14.3 G/DL (ref 12–15.9)
IMM GRANULOCYTES # BLD AUTO: 0.02 10*3/MM3 (ref 0–0.05)
IMM GRANULOCYTES NFR BLD AUTO: 0.2 % (ref 0–0.5)
LDLC SERPL CALC-MCNC: 165 MG/DL (ref 0–100)
LDLC/HDLC SERPL: 3.61 {RATIO}
LYMPHOCYTES # BLD AUTO: 3.98 10*3/MM3 (ref 0.7–3.1)
LYMPHOCYTES NFR BLD AUTO: 46.4 % (ref 19.6–45.3)
MAGNESIUM SERPL-MCNC: 2.3 MG/DL (ref 1.6–2.6)
MCH RBC QN AUTO: 26.4 PG (ref 26.6–33)
MCHC RBC AUTO-ENTMCNC: 31.3 G/DL (ref 31.5–35.7)
MCV RBC AUTO: 84.3 FL (ref 79–97)
MONOCYTES # BLD AUTO: 0.59 10*3/MM3 (ref 0.1–0.9)
MONOCYTES NFR BLD AUTO: 6.9 % (ref 5–12)
NEUTROPHILS NFR BLD AUTO: 3.62 10*3/MM3 (ref 1.7–7)
NEUTROPHILS NFR BLD AUTO: 42.3 % (ref 42.7–76)
NRBC BLD AUTO-RTO: 0 /100 WBC (ref 0–0.2)
PLATELET # BLD AUTO: 327 10*3/MM3 (ref 140–450)
PMV BLD AUTO: 11.1 FL (ref 6–12)
POTASSIUM SERPL-SCNC: 4.8 MMOL/L (ref 3.5–5.2)
PROT SERPL-MCNC: 7.2 G/DL (ref 6–8.5)
RBC # BLD AUTO: 5.42 10*6/MM3 (ref 3.77–5.28)
SODIUM SERPL-SCNC: 139 MMOL/L (ref 136–145)
TRIGL SERPL-MCNC: 162 MG/DL (ref 0–150)
TSH SERPL DL<=0.05 MIU/L-ACNC: 1.69 UIU/ML (ref 0.27–4.2)
VIT B12 BLD-MCNC: 337 PG/ML (ref 211–946)
VLDLC SERPL-MCNC: 30 MG/DL (ref 5–40)
WBC NRBC COR # BLD AUTO: 8.57 10*3/MM3 (ref 3.4–10.8)

## 2024-10-11 PROCEDURE — 80061 LIPID PANEL: CPT | Performed by: PREVENTIVE MEDICINE

## 2024-10-11 PROCEDURE — 36415 COLL VENOUS BLD VENIPUNCTURE: CPT

## 2024-10-11 PROCEDURE — 80050 GENERAL HEALTH PANEL: CPT | Performed by: PREVENTIVE MEDICINE

## 2024-10-11 PROCEDURE — 83735 ASSAY OF MAGNESIUM: CPT | Performed by: PREVENTIVE MEDICINE

## 2024-10-11 PROCEDURE — 82306 VITAMIN D 25 HYDROXY: CPT | Performed by: PREVENTIVE MEDICINE

## 2024-10-11 PROCEDURE — 82607 VITAMIN B-12: CPT | Performed by: PREVENTIVE MEDICINE

## 2024-10-12 NOTE — PROGRESS NOTES
Total and bad cholesterol are both elevated and if you have done all you can with diet and exercise we would suggest that you do try a statin.  Please let me know if you agree.  Both vitamin D and B12 are low so increase to 1000 units of each 1 daily over-the-counter call if any other questions or concerns and let us know about the above

## 2024-10-13 ENCOUNTER — TELEPHONE (OUTPATIENT)
Dept: FAMILY MEDICINE CLINIC | Facility: CLINIC | Age: 54
End: 2024-10-13
Payer: MEDICAID

## 2024-10-13 NOTE — TELEPHONE ENCOUNTER
Hub to realay  ----- Message from Silvia Henderson sent at 10/12/2024  9:31 AM EDT -----  Total and bad cholesterol are both elevated and if you have done all you can with diet and exercise we would suggest that you do try a statin.  Please let me know if you agree.  Both vitamin D and B12 are low so increase to 1000 units of each 1 daily over-the-counter call if any other questions or concerns and let us know about the above

## 2024-10-14 NOTE — TELEPHONE ENCOUNTER
Patient advised of lab results. Verbalized understanding. No questions or concerns at this time.   Patient states she will work on diet and exercise before starting a statin.

## 2024-10-17 NOTE — PATIENT INSTRUCTIONS
Health Maintenance Due   Topic Date Due    MAMMOGRAM  Never done    COLORECTAL CANCER SCREENING  Never done    TDAP/TD VACCINES (1 - Tdap) Never done    ZOSTER VACCINE (1 of 2) Never done    LUNG CANCER SCREENING  Never done    PAP SMEAR  01/30/2023    ANNUAL PHYSICAL  07/20/2024    BMI FOLLOWUP  07/20/2024    INFLUENZA VACCINE  Never done    COVID-19 Vaccine (1 - 2023-24 season) Never done

## 2024-10-18 ENCOUNTER — OFFICE VISIT (OUTPATIENT)
Dept: FAMILY MEDICINE CLINIC | Facility: CLINIC | Age: 54
End: 2024-10-18
Payer: MEDICAID

## 2024-10-18 VITALS
OXYGEN SATURATION: 97 % | SYSTOLIC BLOOD PRESSURE: 118 MMHG | WEIGHT: 234.6 LBS | TEMPERATURE: 98 F | BODY MASS INDEX: 37.7 KG/M2 | HEART RATE: 69 BPM | HEIGHT: 66 IN | DIASTOLIC BLOOD PRESSURE: 79 MMHG

## 2024-10-18 DIAGNOSIS — Z87.892 PERSONAL HISTORY OF ANAPHYLAXIS: ICD-10-CM

## 2024-10-18 DIAGNOSIS — D11.9 WARTHIN TUMOR: ICD-10-CM

## 2024-10-18 DIAGNOSIS — R06.2 WHEEZING: ICD-10-CM

## 2024-10-18 DIAGNOSIS — R00.1 BRADYCARDIA: ICD-10-CM

## 2024-10-18 DIAGNOSIS — Z12.11 SCREENING FOR COLON CANCER: ICD-10-CM

## 2024-10-18 DIAGNOSIS — E55.9 VITAMIN D DEFICIENCY: ICD-10-CM

## 2024-10-18 DIAGNOSIS — R87.69: Chronic | ICD-10-CM

## 2024-10-18 DIAGNOSIS — E66.812 CLASS 2 OBESITY DUE TO EXCESS CALORIES WITHOUT SERIOUS COMORBIDITY WITH BODY MASS INDEX (BMI) OF 37.0 TO 37.9 IN ADULT: ICD-10-CM

## 2024-10-18 DIAGNOSIS — N90.89 VULVAR LESION: ICD-10-CM

## 2024-10-18 DIAGNOSIS — Z12.4 SCREENING FOR CERVICAL CANCER: ICD-10-CM

## 2024-10-18 DIAGNOSIS — Z12.31 ENCOUNTER FOR SCREENING MAMMOGRAM FOR MALIGNANT NEOPLASM OF BREAST: ICD-10-CM

## 2024-10-18 DIAGNOSIS — I10 HYPERTENSION, UNSPECIFIED TYPE: ICD-10-CM

## 2024-10-18 DIAGNOSIS — E66.09 CLASS 2 OBESITY DUE TO EXCESS CALORIES WITHOUT SERIOUS COMORBIDITY WITH BODY MASS INDEX (BMI) OF 37.0 TO 37.9 IN ADULT: ICD-10-CM

## 2024-10-18 DIAGNOSIS — E78.5 HYPERLIPIDEMIA, UNSPECIFIED HYPERLIPIDEMIA TYPE: ICD-10-CM

## 2024-10-18 DIAGNOSIS — Z12.2 ENCOUNTER FOR SCREENING FOR LUNG CANCER: ICD-10-CM

## 2024-10-18 DIAGNOSIS — Z00.01 ENCOUNTER FOR ANNUAL GENERAL MEDICAL EXAMINATION WITH ABNORMAL FINDINGS IN ADULT: Primary | ICD-10-CM

## 2024-10-18 DIAGNOSIS — Z72.0 TOBACCO USE: ICD-10-CM

## 2024-10-18 RX ORDER — EPINEPHRINE 0.3 MG/.3ML
0.3 INJECTION SUBCUTANEOUS AS NEEDED
Qty: 1 EACH | Refills: 1 | Status: SHIPPED | OUTPATIENT
Start: 2024-10-18

## 2024-10-18 NOTE — PROGRESS NOTES
Subjective   Melia García is a 54 y.o. female presents for   Chief Complaint   Patient presents with    Annual Exam     Patient declines vaccines       Health Maintenance Due   Topic Date Due    MAMMOGRAM  Never done    COLORECTAL CANCER SCREENING  Never done    TDAP/TD VACCINES (1 - Tdap) Never done    ZOSTER VACCINE (1 of 2) Never done    LUNG CANCER SCREENING  Never done    PAP SMEAR  01/30/2023    ANNUAL PHYSICAL  07/20/2024    BMI FOLLOWUP  07/20/2024    INFLUENZA VACCINE  Never done    COVID-19 Vaccine (1 - 2023-24 season) Never done       History of Present Illness   History of Present Illness  The patient is a 54-year-old female presenting today for her annual wellness exam and age-specific physical screenings, including mammogram, colon cancer, lung cancer, and cervical cancer screenings.    She reports no current wheezing and is not taking vitamin D supplements despite being aware of a deficiency. Her blood pressure is within the normal range. She acknowledges weight gain and has a body mass index of 37, classifying her as class 2 obese due to excess calories with serious comorbidities. Her recent lab results are available.    She continues to smoke and has not had a low-dose CT scan of the chest. She has declined pneumonia, shingles, and influenza vaccines. She is scheduled for a mammogram on Tuesday and a Pap smear on 01/15/2025. She has a Cologuard box at home but is unsure about insurance coverage.    She reports a history of a right parotid gland tumor, which has healed and was followed up yearly. She underwent surgery for a high-grade squamous intraepithelial lesion of the right labia majora and has been following up with her gynecologist, although it has been 2 years since her last visit.    She describes symptoms of a bee sting allergy, including yellowing of the skin, swelling of the ears and lips, and reports not having an EpiPen at home. She recalls a bee sting incident 6 years ago that did  "not result in any adverse effects. She plans to travel to [place] in 12/2024.    She mentions fluid in her ears but reports no changes in hearing or vision since her last visit. She does not experience difficulty swallowing or indigestion and does not cough up blood or sputum. She experiences occasional wheezing at night when lying down, which she attributes to certain foods like cereal or milk products. She notes that her feet swell when she consumes pork or before it rains.    Her last eye and dental exams were within the past year.    SOCIAL HISTORY  She still smokes.    FAMILY HISTORY  There is no family history of colon cancer.    ALLERGIES  She is allergic to BEE STING.    Vitals:    10/18/24 1409 10/18/24 1411   BP: 123/79 118/79   BP Location: Left arm Right arm   Patient Position: Sitting Sitting   Cuff Size: Adult Adult   Pulse: 95 69   Temp: 98 °F (36.7 °C)    TempSrc: Temporal    SpO2: 97%    Weight: 106 kg (234 lb 9.6 oz)    Height: 167.6 cm (65.98\")      Body mass index is 37.89 kg/m².    Current Outpatient Medications on File Prior to Visit   Medication Sig Dispense Refill    albuterol sulfate  (90 Base) MCG/ACT inhaler Inhale 2 puffs Every 4 (Four) Hours As Needed for Wheezing. 1 g 1    [DISCONTINUED] doxycycline (MONODOX) 100 MG capsule Take 1 capsule by mouth 2 (Two) Times a Day. 20 capsule 0    [DISCONTINUED] doxycycline (VIBRAMYCIN) 100 MG capsule Take 1 capsule by mouth 2 (Two) Times a Day.      [DISCONTINUED] EPINEPHrine (EpiPen 2-Filiberto) 0.3 MG/0.3ML solution auto-injector injection Inject 0.3 mL under the skin into the appropriate area as directed As Needed (use for reaction). 1 each 1     No current facility-administered medications on file prior to visit.       The following portions of the patient's history were reviewed and updated as appropriate: allergies, current medications, past family history, past medical history, past social history, past surgical history, and problem " list.    Review of Systems   HENT:          Right neck swelling   Respiratory:  Positive for wheezing.        Objective   Physical Exam  Vitals reviewed.   Constitutional:       General: She is not in acute distress.     Appearance: She is well-developed. She is obese. She is not ill-appearing or toxic-appearing.   HENT:      Head: Normocephalic and atraumatic.      Right Ear: Tympanic membrane, ear canal and external ear normal.      Left Ear: Tympanic membrane, ear canal and external ear normal.      Nose: Nose normal.      Mouth/Throat:      Mouth: Mucous membranes are moist.      Pharynx: No posterior oropharyngeal erythema.   Eyes:      Extraocular Movements: Extraocular movements intact.      Conjunctiva/sclera: Conjunctivae normal.      Pupils: Pupils are equal, round, and reactive to light.   Neck:      Vascular: No carotid bruit.   Cardiovascular:      Rate and Rhythm: Normal rate and regular rhythm.      Heart sounds: Normal heart sounds.   Pulmonary:      Effort: Pulmonary effort is normal.      Comments: Decreased breath sounds bilaterally  Abdominal:      General: Bowel sounds are normal. There is no distension.      Palpations: Abdomen is soft. There is no mass.      Tenderness: There is no abdominal tenderness. There is no right CVA tenderness or left CVA tenderness.   Musculoskeletal:         General: Normal range of motion.      Cervical back: Neck supple.   Skin:     General: Skin is warm.   Neurological:      General: No focal deficit present.      Mental Status: She is alert and oriented to person, place, and time.   Psychiatric:         Mood and Affect: Mood normal.         Behavior: Behavior normal.       Physical Exam  Heart exhibits a good rate and rhythm.    Vital Signs  Blood pressure readings are 123/79 and 118/79. Pulse rate is 95.    PHQ-9 Total Score:    Results           Assessment & Plan   Diagnoses and all orders for this visit:    1. Encounter for annual general medical examination  with abnormal findings in adult (Primary)    2. Encounter for screening mammogram for malignant neoplasm of breast    3. Screening for colon cancer  -     Cologuard - Stool, Per Rectum; Future    4. Encounter for screening for lung cancer    5. Screening for cervical cancer    6. Wheezing    7. Vulvar lesion  -     Ambulatory Referral to Obstetrics / Gynecology    8. Vitamin D deficiency    9. 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 Personal history of anaphylaxis    12. Hypertension, unspecified type    13. Hyperlipidemia, unspecified hyperlipidemia type    14. 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 Bradycardia    16. Class 2 obesity due to excess calories without serious comorbidity with body mass index (BMI) of 37.0 to 37.9 in adult    Other orders  -     EPINEPHrine (EpiPen 2-Filiberto) 0.3 MG/0.3ML solution auto-injector injection; Inject 0.3 mL under the skin into the appropriate area as directed As Needed (use for reaction).  Dispense: 1 each; Refill: 1      Assessment & Plan  1. Hypertension.  Her blood pressure readings today are satisfactory, with readings of 123/79 and 118/79. She is advised to continue monitoring her blood pressure.    2. 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  The previously noted right parotid gland tumor appears to have resolved and looks good.    3. Anaphylaxis.  An EpiPen prescription will be sent to [facility] in [place].    4. Hyperlipidemia.  A cholesterol test will be conducted.    5. High-grade squamous intraepithelial lesion of the right labia majora.  A referral for a Pap smear has been arranged and is scheduled for January 15.    6. Colon cancer screening.  A Cologuard test will be performed.    7. Tobacco abuse.  A low-dose CT scan of the chest has been ordered.    8. Vitamin D deficiency.  She is advised to start taking Vitamin D supplements.    9. Class II obesity.  She has  been advised to monitor her portion sizes and increase her physical activity, particularly walking. She is also advised to reduce her salt intake.    10. Wheezing.  Flonase nasal spray has been recommended for use.    11. Health Maintenance.  She is scheduled for a mammogram on Tuesday. She still needs to complete her dental and eye exams.        Patient Instructions     Health Maintenance Due   Topic Date Due    MAMMOGRAM  Never done    COLORECTAL CANCER SCREENING  Never done    TDAP/TD VACCINES (1 - Tdap) Never done    ZOSTER VACCINE (1 of 2) Never done    LUNG CANCER SCREENING  Never done    PAP SMEAR  01/30/2023    ANNUAL PHYSICAL  07/20/2024    BMI FOLLOWUP  07/20/2024    INFLUENZA VACCINE  Never done    COVID-19 Vaccine (1 - 2023-24 season) Never done           Patient or patient representative verbalized consent for the use of Ambient Listening during the visit with  Silvia Henderson MD for chart documentation. 10/18/2024  17:45 EDT

## 2024-11-22 ENCOUNTER — TELEPHONE (OUTPATIENT)
Dept: FAMILY MEDICINE CLINIC | Facility: CLINIC | Age: 54
End: 2024-11-22
Payer: MEDICAID

## 2024-11-22 DIAGNOSIS — R19.5 POSITIVE COLORECTAL CANCER SCREENING USING COLOGUARD TEST: ICD-10-CM

## 2024-11-22 DIAGNOSIS — R19.5 POSITIVE COLORECTAL CANCER SCREENING USING COLOGUARD TEST: Primary | ICD-10-CM

## 2024-11-22 DIAGNOSIS — Z12.11 SCREENING FOR MALIGNANT NEOPLASM OF COLON: Primary | ICD-10-CM

## 2024-11-22 NOTE — TELEPHONE ENCOUNTER
Melia García notified and voiced comprehension and understanding.    Patient is ok with using [facility] for her colonoscopy.

## 2024-11-22 NOTE — TELEPHONE ENCOUNTER
"HUB TO RELAY    \"Cologuasabas was positive so you should undergo colonoscopy.  Is that okay I put in a referral to our colorectal specialist in order to get this accomplished as soon as possible.  If you would prefer to go to [facility] please let the staff know and the order will be switched.  There is also with Dr. Draper in [place] that does colonoscopy.\"  "

## 2024-12-02 ENCOUNTER — PREP FOR SURGERY (OUTPATIENT)
Dept: OTHER | Facility: HOSPITAL | Age: 54
End: 2024-12-02
Payer: MEDICAID

## 2024-12-02 DIAGNOSIS — Z12.11 ENCOUNTER FOR SCREENING COLONOSCOPY: Primary | ICD-10-CM

## 2025-01-08 ENCOUNTER — TELEPHONE (OUTPATIENT)
Dept: FAMILY MEDICINE CLINIC | Facility: CLINIC | Age: 55
End: 2025-01-08
Payer: MEDICAID

## 2025-01-08 NOTE — TELEPHONE ENCOUNTER
Patient called after hours asking for Doxy for URI. Doesn't feel like she can get out of [address] to be seen. Will have sister  medication at Pharmacy.

## 2025-02-12 NOTE — ASSESSMENT & PLAN NOTE
Patient's (There is no height or weight on file to calculate BMI.) indicates that they are obese (BMI >30) with health conditions that include hypertension and dyslipidemias . Weight is unchanged. BMI  is above average; BMI management plan is completed. We discussed portion control and increasing exercise.

## 2025-02-12 NOTE — PATIENT INSTRUCTIONS
Health Maintenance Due   Topic Date Due    Pneumococcal Vaccine 50+ (1 of 2 - PCV) Never done    TDAP/TD VACCINES (1 - Tdap) Never done    MAMMOGRAM  Never done    ZOSTER VACCINE (1 of 2) Never done    LUNG CANCER SCREENING  Never done    PAP SMEAR  01/30/2023    INFLUENZA VACCINE  Never done    BMI FOLLOWUP  07/20/2024    COVID-19 Vaccine (1 - 2024-25 season) Never done

## 2025-02-13 ENCOUNTER — OFFICE VISIT (OUTPATIENT)
Dept: FAMILY MEDICINE CLINIC | Facility: CLINIC | Age: 55
End: 2025-02-13
Payer: MEDICAID

## 2025-02-13 VITALS
BODY MASS INDEX: 38.09 KG/M2 | TEMPERATURE: 98.2 F | OXYGEN SATURATION: 90 % | HEIGHT: 66 IN | HEART RATE: 55 BPM | WEIGHT: 237 LBS | SYSTOLIC BLOOD PRESSURE: 135 MMHG | RESPIRATION RATE: 18 BRPM | DIASTOLIC BLOOD PRESSURE: 78 MMHG

## 2025-02-13 DIAGNOSIS — Z12.4 SCREENING FOR CERVICAL CANCER: ICD-10-CM

## 2025-02-13 DIAGNOSIS — E66.812 CLASS 2 OBESITY DUE TO EXCESS CALORIES WITHOUT SERIOUS COMORBIDITY WITH BODY MASS INDEX (BMI) OF 38.0 TO 38.9 IN ADULT: ICD-10-CM

## 2025-02-13 DIAGNOSIS — R52 BODY ACHES: ICD-10-CM

## 2025-02-13 DIAGNOSIS — R05.1 ACUTE COUGH: Primary | ICD-10-CM

## 2025-02-13 DIAGNOSIS — R87.69: Chronic | ICD-10-CM

## 2025-02-13 DIAGNOSIS — Z12.2 ENCOUNTER FOR SCREENING FOR LUNG CANCER: ICD-10-CM

## 2025-02-13 DIAGNOSIS — Z72.0 TOBACCO USE: ICD-10-CM

## 2025-02-13 DIAGNOSIS — Z12.31 ENCOUNTER FOR SCREENING MAMMOGRAM FOR MALIGNANT NEOPLASM OF BREAST: ICD-10-CM

## 2025-02-13 DIAGNOSIS — I10 HYPERTENSION, UNSPECIFIED TYPE: ICD-10-CM

## 2025-02-13 DIAGNOSIS — E66.09 CLASS 2 OBESITY DUE TO EXCESS CALORIES WITHOUT SERIOUS COMORBIDITY WITH BODY MASS INDEX (BMI) OF 38.0 TO 38.9 IN ADULT: ICD-10-CM

## 2025-02-13 PROCEDURE — 99214 OFFICE O/P EST MOD 30 MIN: CPT | Performed by: PREVENTIVE MEDICINE

## 2025-02-13 PROCEDURE — 3078F DIAST BP <80 MM HG: CPT | Performed by: PREVENTIVE MEDICINE

## 2025-02-13 PROCEDURE — 1126F AMNT PAIN NOTED NONE PRSNT: CPT | Performed by: PREVENTIVE MEDICINE

## 2025-02-13 PROCEDURE — 1159F MED LIST DOCD IN RCRD: CPT | Performed by: PREVENTIVE MEDICINE

## 2025-02-13 PROCEDURE — 3075F SYST BP GE 130 - 139MM HG: CPT | Performed by: PREVENTIVE MEDICINE

## 2025-02-13 PROCEDURE — 87428 SARSCOV & INF VIR A&B AG IA: CPT | Performed by: PREVENTIVE MEDICINE

## 2025-02-13 PROCEDURE — 1160F RVW MEDS BY RX/DR IN RCRD: CPT | Performed by: PREVENTIVE MEDICINE

## 2025-02-13 RX ORDER — DOXYCYCLINE 100 MG/1
100 CAPSULE ORAL 2 TIMES DAILY
Qty: 20 CAPSULE | Refills: 0 | Status: SHIPPED | OUTPATIENT
Start: 2025-02-13

## 2025-02-13 NOTE — PROGRESS NOTES
Subjective   Melia García is a 54 y.o. female presents for   Chief Complaint   Patient presents with    URI       Health Maintenance Due   Topic Date Due    Pneumococcal Vaccine 50+ (1 of 2 - PCV) Never done    TDAP/TD VACCINES (1 - Tdap) Never done    MAMMOGRAM  Never done    ZOSTER VACCINE (1 of 2) Never done    LUNG CANCER SCREENING  Never done    PAP SMEAR  01/30/2023    INFLUENZA VACCINE  Never done    COVID-19 Vaccine (1 - 2024-25 season) Never done       URI   Associated symptoms include coughing.      History of Present Illness  The patient is a 54-year-old female who presents today with an acute cough, body aches, high-grade squamous lesion of the right labia, class II obesity, hypertension, and tobacco use.    She has been experiencing intermittent episodes of feeling unwell for the past week, characterized by alternating periods of cold and warmth, fatigue, a persistent cough, and diarrhea. She reports no presence of blood in her stool, which initially presented as watery but has since become more formed. She had an episode of incontinence during a coughing fit. She also reports a sensation of fluid dripping when leaning forward. She has not experienced any fevers or dysuria. She reports no ear discomfort, although she did experience a brief episode of ear pain after eating a banana. She is a smoker and is currently attempting to reduce her smoking habit. Approximately two months ago, she experienced lung discomfort, describing it as a sensation of glass in her lungs during coughing. This symptom resolved spontaneously, leading her to suspect a possible episode of pneumonia. She reports expectoration of thick, colored sputum but no hemoptysis. She recalls a previous episode of suspected pneumonia over a year ago, which was successfully treated with doxycycline.    She is due for a mammogram and Pap smear. She has an appointment with her gynecologist, Dr. Melendez, in 04/2025.    SOCIAL HISTORY  The  "patient admits to smoking.    MEDICATIONS  Past: doxycycline    Vitals:    02/13/25 1151 02/13/25 1152   BP: 148/90 135/78   BP Location: Left arm Right arm   Patient Position: Sitting    Cuff Size: Large Adult    Pulse: 55    Resp: 18    Temp: 98.2 °F (36.8 °C)    TempSrc: Temporal    SpO2: 90%    Weight: 108 kg (237 lb)    Height: 167.6 cm (65.98\")      Body mass index is 38.27 kg/m².    Current Outpatient Medications on File Prior to Visit   Medication Sig Dispense Refill    albuterol sulfate  (90 Base) MCG/ACT inhaler Inhale 2 puffs Every 4 (Four) Hours As Needed for Wheezing. 1 g 1    EPINEPHrine (EpiPen 2-Filiberto) 0.3 MG/0.3ML solution auto-injector injection Inject 0.3 mL under the skin into the appropriate area as directed As Needed (use for reaction). 1 each 1    [DISCONTINUED] polyethylene glycol (GoLYTELY) 236 g solution Mix the Golytely solution and put it in the fridge a few hours before drinking. Cold is better! 5:00 PM the day before your colonoscopy It's time for your 1st dose of bowel prep. Drink half of the bottle within 1 hour. Sip each glass quickly, and using a straw might make it easier. Put the rest in the fridge for later. 5 AM the morning of your colonoscopy, take your 2nd dose of bowel prep. 1 mL 0     No current facility-administered medications on file prior to visit.       The following portions of the patient's history were reviewed and updated as appropriate: allergies, current medications, past family history, past medical history, past social history, past surgical history, and problem list.    Review of Systems   Constitutional:  Positive for chills, fatigue and fever.   Respiratory:  Positive for cough.    Musculoskeletal:  Positive for arthralgias and myalgias.       Objective   Physical Exam  Vitals reviewed.   Constitutional:       General: She is not in acute distress.     Appearance: She is well-developed. She is obese. She is not ill-appearing or toxic-appearing.   HENT: "      Head: Normocephalic and atraumatic.      Right Ear: Tympanic membrane, ear canal and external ear normal.      Left Ear: Tympanic membrane, ear canal and external ear normal.      Nose: Congestion present.      Mouth/Throat:      Mouth: Mucous membranes are moist.      Pharynx: No posterior oropharyngeal erythema.   Eyes:      Extraocular Movements: Extraocular movements intact.      Conjunctiva/sclera: Conjunctivae normal.      Pupils: Pupils are equal, round, and reactive to light.   Neck:      Vascular: No carotid bruit.   Cardiovascular:      Rate and Rhythm: Normal rate and regular rhythm.      Heart sounds: Normal heart sounds.   Pulmonary:      Effort: Pulmonary effort is normal.      Comments: Decreased breath sounds bilaterally  Abdominal:      General: Bowel sounds are normal. There is no distension.      Palpations: Abdomen is soft. There is no mass.      Tenderness: There is no abdominal tenderness. There is no right CVA tenderness or left CVA tenderness.   Musculoskeletal:         General: Normal range of motion.      Cervical back: Neck supple. No tenderness.      Right lower leg: No edema.      Left lower leg: No edema.   Lymphadenopathy:      Cervical: Cervical adenopathy present.   Skin:     General: Skin is warm.   Neurological:      General: No focal deficit present.      Mental Status: She is alert and oriented to person, place, and time.   Psychiatric:         Mood and Affect: Mood normal.         Behavior: Behavior normal.       Physical Exam  Ears appear normal with no signs of infection. Throat appears normal with no signs of strep throat.  Lungs were auscultated.  Heart rate is 51 and rhythm is normal.    Vital Signs  Blood pressure was slightly elevated in the left arm but normal in the right arm.    PHQ-9 Total Score:    Results  Laboratory Studies  Influenza A, influenza B, and COVID-19 tests were negative.         Assessment & Plan   Diagnoses and all orders for this visit:    1.  Acute cough (Primary)  -     POCT SARS-CoV-2 Antigen RACQUEL + Flu    2. Body aches  -     POCT SARS-CoV-2 Antigen RACQUEL + Flu    3. Encounter for screening mammogram for malignant neoplasm of breast    4. Encounter for screening for lung cancer  -      CT Chest Low Dose Cancer Screening WO; Future    5. Screening for cervical cancer  -     Ambulatory Referral to Obstetrics / Gynecology    6. 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 Class 2 obesity due to excess calories without serious comorbidity with body mass index (BMI) of 38.0 to 38.9 in adult  Assessment & Plan:  Patient's (There is no height or weight on file to calculate BMI.) indicates that they are obese (BMI >30) with health conditions that include hypertension and dyslipidemias . Weight is unchanged. BMI  is above average; BMI management plan is completed. We discussed portion control and increasing exercise.       8. Hypertension, unspecified type    9. Tobacco use    Other orders  -     doxycycline (MONODOX) 100 MG capsule; Take 1 capsule by mouth 2 (Two) Times a Day.  Dispense: 20 capsule; Refill: 0      Assessment & Plan  1. Acute cough.  She has been experiencing symptoms for a week, including an annoying cough, fatigue, and diarrhea. Influenza A, influenza B, and COVID-19 tests were negative. There is no evidence of strep throat or ear infection upon examination. A chest x-ray will be ordered to rule out pneumonia. Doxycycline will be prescribed and sent to [facility] in [place]. She is advised to avoid contact with her mother to prevent potential transmission of her illness. If symptoms persist, blood work will be considered next week.    2. Diarrhea.  She reports watery diarrhea without blood, which has started to bulk up. She is advised to avoid spicy and greasy foods and limit dairy intake.    3. Hypertension.  Her blood pressure was slightly elevated in the left arm but  normal in the right arm. She is advised to monitor her sodium and alcohol intake, manage her weight, and continue her walking regimen.    4. Tobacco use.  She continues to smoke. Assistance with smoking cessation was offered, but she is currently trying to cut back on her own.    5. Health maintenance.  She is due for a low-dose lung cancer screening. A CT scan of the chest will be ordered to rule out pneumonia and cancer. She is also due for a mammogram and Pap smear, which were previously ordered in August 2024. She is advised to schedule these screenings.    Patient Instructions     Health Maintenance Due   Topic Date Due    Pneumococcal Vaccine 50+ (1 of 2 - PCV) Never done    TDAP/TD VACCINES (1 - Tdap) Never done    MAMMOGRAM  Never done    ZOSTER VACCINE (1 of 2) Never done    LUNG CANCER SCREENING  Never done    PAP SMEAR  01/30/2023    INFLUENZA VACCINE  Never done    BMI FOLLOWUP  07/20/2024    COVID-19 Vaccine (1 - 2024-25 season) Never done           Patient or patient representative verbalized consent for the use of Ambient Listening during the visit with  Silvia Henderson MD for chart documentation. 2/13/2025  17:45 EST

## 2025-05-12 ENCOUNTER — TELEPHONE (OUTPATIENT)
Dept: FAMILY MEDICINE CLINIC | Facility: CLINIC | Age: 55
End: 2025-05-12
Payer: MEDICAID

## 2025-05-12 DIAGNOSIS — Z12.11 SCREENING FOR COLON CANCER: Primary | ICD-10-CM

## 2025-05-12 NOTE — TELEPHONE ENCOUNTER
Caller: Melia García    Relationship: Self    Best call back number: [phone]     What orders are you requesting (i.e. lab or imaging): COLOGARD    In what timeframe would the patient need to come in: AS SOON AS POSSIBLE    Where will you receive your lab/imaging services: PLEASE SHIP TO HOME ADDRESS.

## 2025-05-21 ENCOUNTER — TELEPHONE (OUTPATIENT)
Dept: FAMILY MEDICINE CLINIC | Facility: CLINIC | Age: 55
End: 2025-05-21
Payer: MEDICAID

## 2025-05-21 NOTE — TELEPHONE ENCOUNTER
Called and spoke to patient asking her about her CT chest low dose cancer. She stated she is still going to do this and took the number for scheduling.